# Patient Record
Sex: FEMALE | Race: WHITE | NOT HISPANIC OR LATINO | Employment: OTHER | URBAN - METROPOLITAN AREA
[De-identification: names, ages, dates, MRNs, and addresses within clinical notes are randomized per-mention and may not be internally consistent; named-entity substitution may affect disease eponyms.]

---

## 2020-03-19 LAB
EXTERNAL ABO GROUPING: NORMAL
EXTERNAL ANTIBODY SCREEN: NEGATIVE
EXTERNAL CHLAMYDIA SCREEN: NEGATIVE
EXTERNAL GONORRHEA SCREEN: NEGATIVE
EXTERNAL HEPATITIS B SURFACE ANTIGEN: NEGATIVE
EXTERNAL HEPATITIS C AB: NEGATIVE
EXTERNAL RH FACTOR: POSITIVE
EXTERNAL RUBELLA QUALITATIVE: NORMAL
EXTERNAL SYPHILIS RPR SCREEN: NORMAL
EXTERNAL URINE DRUG SCREEN: NEGATIVE
HIV1 P24 AG SERPL QL IA: NORMAL

## 2020-08-13 LAB — EXTERNAL GTT 1 HOUR: NORMAL

## 2020-08-21 LAB
EXTERNAL GLUCOSE TOLERANCE TEST FASTING: 78 MG/DL
EXTERNAL GTT 3 HOURS: NORMAL

## 2020-08-24 ENCOUNTER — TRANSCRIBE ORDERS (OUTPATIENT)
Dept: DIABETES SERVICES | Facility: HOSPITAL | Age: 25
End: 2020-08-24

## 2020-08-24 DIAGNOSIS — O24.410 DIET CONTROLLED GESTATIONAL DIABETES MELLITUS (GDM), ANTEPARTUM: Primary | ICD-10-CM

## 2020-08-28 ENCOUNTER — HOSPITAL ENCOUNTER (OUTPATIENT)
Dept: DIABETES SERVICES | Facility: HOSPITAL | Age: 25
Setting detail: RECURRING SERIES
Discharge: HOME | End: 2020-08-28

## 2020-08-28 NOTE — CONSULTS
This medical referred consult was provided as a telephone call, tele-health or e-visit, as patient is unable to attend an in-office appointment due to the COVID-19 crisis. Consent for treatment was given verbally.  Patient attended their scheduled gestational diabetes education visit.  Please see media tab for assessment and notes if you use [facility]. If you are not an [employer] user a copy of patient's assessment and notes will be sent per routine. Thank you.

## 2020-09-03 ENCOUNTER — ROUTINE PRENATAL (OUTPATIENT)
Dept: OBSTETRICS AND GYNECOLOGY | Facility: CLINIC | Age: 25
End: 2020-09-03

## 2020-09-03 VITALS — DIASTOLIC BLOOD PRESSURE: 70 MMHG | SYSTOLIC BLOOD PRESSURE: 112 MMHG | WEIGHT: 146 LBS

## 2020-09-03 DIAGNOSIS — Z3A.31 31 WEEKS GESTATION OF PREGNANCY: Primary | ICD-10-CM

## 2020-09-03 LAB
GLUCOSE UR STRIP-MCNC: NEGATIVE MG/DL
PROT UR STRIP-MCNC: NEGATIVE MG/DL

## 2020-09-03 PROCEDURE — 99212 OFFICE O/P EST SF 10 MIN: CPT | Performed by: NURSE PRACTITIONER

## 2020-09-03 RX ORDER — PRENATAL VIT/IRON FUM/FOLIC AC 27MG-0.8MG
1 TABLET ORAL DAILY
COMMUNITY
End: 2020-09-03

## 2020-09-03 NOTE — PROGRESS NOTES
OB FOLLOW UP    Arabella Lau is a 24 y.o.  31w4d patient being seen today for her obstetrical follow up visit.      Her prenatal care is complicated by (and status) :    There is no problem list on file for this patient.      ROS -   Patient Reports : no problems  Patient Denies:  Bleeding, headaches, gush fld, visual changes.  LMP 2020                           Assessment    Pregnancy at 31w4d  GDMA1    Plan    1. Will start fingersticks today and bring in results at appts  2. Growth US 2 wks  3. Rev daily [facility], JOSIAH prec      Tiffany Hope, APRN  2020

## 2020-09-09 ENCOUNTER — HOSPITAL ENCOUNTER (OUTPATIENT)
Dept: DIABETES SERVICES | Facility: HOSPITAL | Age: 25
Setting detail: RECURRING SERIES
Discharge: HOME | End: 2020-09-09

## 2020-09-17 ENCOUNTER — ROUTINE PRENATAL (OUTPATIENT)
Dept: OBSTETRICS AND GYNECOLOGY | Facility: CLINIC | Age: 25
End: 2020-09-17

## 2020-09-17 VITALS — DIASTOLIC BLOOD PRESSURE: 54 MMHG | SYSTOLIC BLOOD PRESSURE: 120 MMHG | WEIGHT: 150.4 LBS

## 2020-09-17 DIAGNOSIS — Z34.90 PREGNANCY, UNSPECIFIED GESTATIONAL AGE: Primary | ICD-10-CM

## 2020-09-17 PROBLEM — O24.419 GESTATIONAL DIABETES: Status: ACTIVE | Noted: 2020-08-24

## 2020-09-17 LAB
EXPIRATION DATE: 0
GLUCOSE UR STRIP-MCNC: NEGATIVE MG/DL
Lab: 0
PROT UR STRIP-MCNC: NEGATIVE MG/DL

## 2020-09-17 PROCEDURE — 99213 OFFICE O/P EST LOW 20 MIN: CPT | Performed by: OBSTETRICS & GYNECOLOGY

## 2020-09-17 RX ORDER — DIPHENHYDRAMINE HYDROCHLORIDE 25 MG/1
25 CAPSULE ORAL DAILY
COMMUNITY

## 2020-09-17 RX ORDER — PRENATAL VIT NO.126/IRON/FOLIC 28MG-0.8MG
1 TABLET ORAL DAILY
COMMUNITY

## 2020-09-17 NOTE — PROGRESS NOTES
OB FOLLOW UP  CC- Here for care of pregnancy        Arabella Lau is a 24 y.o.  33w4d patient being seen today for her obstetrical follow up visit/ GDM. Patient reports no complaints. Home blood sugars running 80s (fasting) and 90s-110s (postprandials).    Her prenatal care is complicated by (and status) :    Patient Active Problem List   Diagnosis   • Gestational diabetes       The additional following portions of the patient's history were reviewed and updated as appropriate: allergies, current medications, past family history, past medical history, past social history, past surgical history and problem list.    ROS -   Patient Reports : No Problems  Patient Denies: Loss of Fluid, Vaginal Spotting, Vision Changes, Headaches and Cramping/Contractions   Fetal Movement : normal, > 10    I have reviewed and agree with the HPI, ROS, and historical information as entered above. Lina Martinez MD    /54   Wt 68.2 kg (150 lb 6.4 oz)   LMP 2020     Ultrasound Today: yes, EFW 53rd%    EXAM:   Vitals: See prenatal flowsheet   Abdomen: See prenatal flowsheet   Urine glucose/protein: See prenatal flowsheet   Pelvic: See prenatal flowsheet   HRT: See prenatal flowsheet   Presentation: See prenatal flowsheet   Movement: See prenatal flowsheet          Assessment and Plan    Problem List Items Addressed This Visit     None      Visit Diagnoses     Pregnancy, unspecified gestational age    -  Primary    Relevant Orders    POC Glucose, Urine, Qualitative, Dipstick (Completed)    POC Protein, Urine, Qualitative, Dipstick (Completed)          1. Pregnancy at 33w4d  2. Fetal status reassuring.   3. Activity and Exercise discussed.  Return in about 2 weeks (around 10/1/2020) for F/U Prenatal.  4. US today for growth: 5#2oz. vtx,    Lina Martinez MD  2020

## 2020-10-01 ENCOUNTER — ROUTINE PRENATAL (OUTPATIENT)
Dept: OBSTETRICS AND GYNECOLOGY | Facility: CLINIC | Age: 25
End: 2020-10-01

## 2020-10-01 VITALS
SYSTOLIC BLOOD PRESSURE: 132 MMHG | WEIGHT: 154.5 LBS | DIASTOLIC BLOOD PRESSURE: 54 MMHG | BODY MASS INDEX: 26.38 KG/M2 | HEIGHT: 64 IN

## 2020-10-01 DIAGNOSIS — Z34.90 PREGNANCY, UNSPECIFIED GESTATIONAL AGE: Primary | ICD-10-CM

## 2020-10-01 LAB
EXPIRATION DATE: 0
GLUCOSE UR STRIP-MCNC: ABNORMAL MG/DL
Lab: 0
PROT UR STRIP-MCNC: NEGATIVE MG/DL

## 2020-10-01 PROCEDURE — 90472 IMMUNIZATION ADMIN EACH ADD: CPT | Performed by: NURSE PRACTITIONER

## 2020-10-01 PROCEDURE — 90715 TDAP VACCINE 7 YRS/> IM: CPT | Performed by: NURSE PRACTITIONER

## 2020-10-01 PROCEDURE — 99213 OFFICE O/P EST LOW 20 MIN: CPT | Performed by: NURSE PRACTITIONER

## 2020-10-01 PROCEDURE — 90471 IMMUNIZATION ADMIN: CPT | Performed by: NURSE PRACTITIONER

## 2020-10-01 PROCEDURE — 90686 IIV4 VACC NO PRSV 0.5 ML IM: CPT | Performed by: NURSE PRACTITIONER

## 2020-10-01 NOTE — PROGRESS NOTES
"OB FOLLOW UP  CC- Here for care of pregnancy         Arabella Lau is a 25 y.o.  35w4d patient being seen today for her obstetrical follow up visit. Patient reports rare Bethel Souza and tingling in hands that began this week. Blood glucoses consistent, per patient; 70s-80s fasting and post-prandials 90s-110s. Rare elevated sugars (120s) due to dietary choices. This AM, patient had cheerios with granola, banana bread, and scrambled eggs.    Her prenatal care is complicated by (and status) :    Patient Active Problem List   Diagnosis   • Gestational diabetes       Flu Status: Will give in office today    The additional following portions of the patient's history were reviewed and updated as appropriate: allergies, current medications, past family history, past medical history, past social history, past surgical history and problem list.    ROS -   Patient Reports : rare Bethel Souza  Patient Denies: Loss of Fluid, Vaginal Spotting, Vision Changes and Headaches  Fetal Movement : normal, > 10  All other systems reviewed and are negative.     I have reviewed and agree with the HPI, ROS, and historical information as entered above. Kylah Mills, APRN    /54   Ht 161.3 cm (63.5\")   Wt 70.1 kg (154 lb 8 oz)   LMP 2020   BMI 26.94 kg/m²     Ultrasound Today: no    EXAM:   Vitals: See prenatal flowsheet   Abdomen: See prenatal flowsheet   Urine glucose/protein: See prenatal flowsheet   Pelvic: See prenatal flowsheet   HRT: See prenatal flowsheet   Presentation: See prenatal flowsheet   Movement: See prenatal flowsheet          Assessment and Plan    Problem List Items Addressed This Visit     None      Visit Diagnoses     Pregnancy, unspecified gestational age    -  Primary    Relevant Orders    POC Glucose, Urine, Qualitative, Dipstick (Completed)    POC Protein, Urine, Qualitative, Dipstick (Completed)    Tdap Vaccine Greater Than or Equal To 6yo IM (Completed)    Fluarix/Fluzone/Afluria Quad>6 " Months (Completed)          1. Pregnancy at 35w4d  2. Fetal status reassuring.   3. Activity and Exercise discussed.  Return in about 1 week (around 10/8/2020).    Kylah Mills, APRN  10/01/2020

## 2020-10-09 ENCOUNTER — LAB (OUTPATIENT)
Dept: LAB | Facility: HOSPITAL | Age: 25
End: 2020-10-09

## 2020-10-09 ENCOUNTER — ROUTINE PRENATAL (OUTPATIENT)
Dept: OBSTETRICS AND GYNECOLOGY | Facility: CLINIC | Age: 25
End: 2020-10-09

## 2020-10-09 VITALS — BODY MASS INDEX: 27.03 KG/M2 | SYSTOLIC BLOOD PRESSURE: 130 MMHG | DIASTOLIC BLOOD PRESSURE: 68 MMHG | WEIGHT: 155 LBS

## 2020-10-09 DIAGNOSIS — Z86.32 HISTORY OF GESTATIONAL DIABETES: Primary | ICD-10-CM

## 2020-10-09 DIAGNOSIS — Z3A.36 36 WEEKS GESTATION OF PREGNANCY: ICD-10-CM

## 2020-10-09 LAB
GLUCOSE UR STRIP-MCNC: ABNORMAL MG/DL
PROT UR STRIP-MCNC: NEGATIVE MG/DL

## 2020-10-09 PROCEDURE — 99213 OFFICE O/P EST LOW 20 MIN: CPT | Performed by: OBSTETRICS & GYNECOLOGY

## 2020-10-09 PROCEDURE — 87081 CULTURE SCREEN ONLY: CPT | Performed by: OBSTETRICS & GYNECOLOGY

## 2020-10-09 NOTE — PROGRESS NOTES
"OB FOLLOW UP  CC- Here for care of pregnancy        Arabella Lau is a 25 y.o.  36w5d patient being seen today for her obstetrical follow up visit. Patient reports no complaints and RL pain and \"tension h/a\".     Her prenatal care is complicated by GDM Pt reports fasting BS's <90 and pp <120s      Patient Active Problem List   Diagnosis   • Gestational diabetes       Flu Status: Already given in current flu season    The additional following portions of the patient's history were reviewed and updated as appropriate: allergies, current medications, past family history, past medical history, past social history, past surgical history and problem list.    ROS -   Patient Reports : No Problems  Patient Denies: Loss of Fluid, Vaginal Spotting, Vision Changes and Cramping/Contractions   Fetal Movement : normal  All other systems reviewed and are negative.     I have reviewed and agree with the HPI, ROS, and historical information as entered above. Lina Martinez MD    /68   Wt 70.3 kg (155 lb)   LMP 2020   BMI 27.03 kg/m²     Ultrasound Today: no    EXAM:   Vitals: See prenatal flowsheet   Abdomen: See prenatal flowsheet   Urine glucose/protein: See prenatal flowsheet   Pelvic: See prenatal flowsheet   HRT: See prenatal flowsheet   Presentation: See prenatal flowsheet   Movement: See prenatal flowsheet       GBS Status: Done Today  Her Delivery Plan is: Undecided       Assessment and Plan    Problem List Items Addressed This Visit     None      Visit Diagnoses     History of gestational diabetes    -  Primary    Relevant Orders    POC Urinalysis Dipstick (Completed)    Group B Streptococcus Culture - , Vagina    36 weeks gestation of pregnancy        Relevant Orders    Group B Streptococcus Culture - , Vagina    Fetal Nonstress Test          1. Pregnancy at 36w5d  2. Fetal status reassuring.   3. Activity and Exercise discussed.  Return in about 1 week (around 10/16/2020) for F/U Prenatal, NST Next " Visit.  5.  GDM, good control,. Will start weekly nsts.   6. Attempted CE today, but cherelle has difficulty with pelvic exams. Discussed ACOG recs for induction with A1DM of 39-40 6/7 wks. Uncertain that induction will be possible for cherelle if we cannot check her cervix, or place FB/ cytotec/ cervidil. Hope for spontaneous labor, and discussed option of 1 c/s if she desires.    Lina Martinez MD  10/09/2020

## 2020-10-11 LAB
BACTERIA SPEC AEROBE CULT: ABNORMAL
STREP GROUPING: ABNORMAL

## 2020-10-16 ENCOUNTER — ROUTINE PRENATAL (OUTPATIENT)
Dept: OBSTETRICS AND GYNECOLOGY | Facility: CLINIC | Age: 25
End: 2020-10-16

## 2020-10-16 VITALS — DIASTOLIC BLOOD PRESSURE: 70 MMHG | SYSTOLIC BLOOD PRESSURE: 110 MMHG | BODY MASS INDEX: 27.55 KG/M2 | WEIGHT: 158 LBS

## 2020-10-16 DIAGNOSIS — Z3A.37 37 WEEKS GESTATION OF PREGNANCY: Primary | ICD-10-CM

## 2020-10-16 DIAGNOSIS — Z3A.36 36 WEEKS GESTATION OF PREGNANCY: ICD-10-CM

## 2020-10-16 LAB
EXPIRATION DATE: 0
GLUCOSE UR STRIP-MCNC: NEGATIVE MG/DL
Lab: 0
PROT UR STRIP-MCNC: NEGATIVE MG/DL

## 2020-10-16 PROCEDURE — 99213 OFFICE O/P EST LOW 20 MIN: CPT | Performed by: NURSE PRACTITIONER

## 2020-10-16 NOTE — PROGRESS NOTES
OB FOLLOW UP  CC- Here for care of pregnancy        Arabella Lau is a 25 y.o.  37w5d patient being seen today for her obstetrical follow up visit. Patient reports edema, nausea, diarrhea, heartburn, round ligament pain.  She also reports her at home glucose numbers have been running between upper 70's/lower 80's for fasting and upper 90's-115.    Her prenatal care is complicated by (and status) :    Patient Active Problem List   Diagnosis   • Gestational diabetes       Flu Status: Already given in current flu season    The additional following portions of the patient's history were reviewed and updated as appropriate: allergies, current medications, past family history, past medical history, past social history, past surgical history and problem list.    ROS -   Patient Reports : edema in hands and feet, nausea, diarrhea, heartburn, round ligament pain  Patient Denies: vaginal bleeding, loss of fluids, vomiting, cramping, maxime rios, contractions, headaches, vision changes, dysuria, constipation,   Fetal Movement : Patient reports feeling baby move at least 10 times in 8-10 hours    All other systems reviewed and are negative.     I have reviewed and agree with the HPI, ROS, and historical information as entered above. Ted KAELA Crouch    /70   Wt 71.7 kg (158 lb)   LMP 2020   BMI 27.55 kg/m²     Ultrasound Today: no    EXAM:   Vitals: See prenatal flowsheet   Abdomen: See prenatal flowsheet   Urine glucose/protein: See prenatal flowsheet       HRT: See prenatal flowsheet   Presentation: See prenatal flowsheet   Movement: See prenatal flowsheet       GBS Status: Done Today and Was done on 10/9/2020.  The results are negative.   Her Delivery Plan is: would prefer natural labor but will consider induction after 40 weeks or if necessary.       Assessment and Plan    Problem List Items Addressed This Visit     None      Visit Diagnoses     37 weeks gestation of pregnancy    -  Primary    Relevant  Orders    POC Glucose, Urine, Qualitative, Dipstick (Completed)    POC Protein, Urine, Qualitative, Dipstick (Completed)          1. Pregnancy at 37w5d  2. Fetal status reassuring.   3. Activity and Exercise discussed.  4.  RTO one week with growth US.    5.  Rev daily [facility], labor prec.  Push flds.  Glucose control.   Ted KAELA Crouch  10/16/2020

## 2020-10-22 ENCOUNTER — ROUTINE PRENATAL (OUTPATIENT)
Dept: OBSTETRICS AND GYNECOLOGY | Facility: CLINIC | Age: 25
End: 2020-10-22

## 2020-10-22 VITALS — BODY MASS INDEX: 27.9 KG/M2 | WEIGHT: 160 LBS | DIASTOLIC BLOOD PRESSURE: 76 MMHG | SYSTOLIC BLOOD PRESSURE: 128 MMHG

## 2020-10-22 DIAGNOSIS — O24.419 GESTATIONAL DIABETES MELLITUS (GDM) IN THIRD TRIMESTER, GESTATIONAL DIABETES METHOD OF CONTROL UNSPECIFIED: Primary | ICD-10-CM

## 2020-10-22 DIAGNOSIS — Z3A.38 38 WEEKS GESTATION OF PREGNANCY: ICD-10-CM

## 2020-10-22 LAB
GLUCOSE UR STRIP-MCNC: NEGATIVE MG/DL
PROT UR STRIP-MCNC: NEGATIVE MG/DL

## 2020-10-22 PROCEDURE — 99213 OFFICE O/P EST LOW 20 MIN: CPT | Performed by: NURSE PRACTITIONER

## 2020-10-23 ENCOUNTER — HOSPITAL ENCOUNTER (INPATIENT)
Facility: HOSPITAL | Age: 25
LOS: 2 days | Discharge: HOME | End: 2020-10-25
Attending: OBSTETRICS & GYNECOLOGY | Admitting: OBSTETRICS & GYNECOLOGY

## 2020-10-23 ENCOUNTER — ANESTHESIA EVENT (OUTPATIENT)
Dept: LABOR AND DELIVERY | Facility: HOSPITAL | Age: 25
End: 2020-10-23

## 2020-10-23 ENCOUNTER — ANESTHESIA (OUTPATIENT)
Dept: LABOR AND DELIVERY | Facility: HOSPITAL | Age: 25
End: 2020-10-23

## 2020-10-23 PROBLEM — Z37.9 NORMAL LABOR: Status: ACTIVE | Noted: 2020-10-23

## 2020-10-23 LAB
ABO GROUP BLD: NORMAL
ALP SERPL-CCNC: 150 U/L (ref 39–117)
ALT SERPL W P-5'-P-CCNC: 15 U/L (ref 1–33)
AST SERPL-CCNC: 33 U/L (ref 1–32)
BILIRUB SERPL-MCNC: 0.2 MG/DL (ref 0–1.2)
BLD GP AB SCN SERPL QL: NEGATIVE
CREAT SERPL-MCNC: 0.55 MG/DL (ref 0.57–1)
DEPRECATED RDW RBC AUTO: 53.7 FL (ref 37–54)
ERYTHROCYTE [DISTWIDTH] IN BLOOD BY AUTOMATED COUNT: 14.7 % (ref 12.3–15.4)
GLUCOSE BLDC GLUCOMTR-MCNC: 80 MG/DL (ref 70–130)
GLUCOSE BLDC GLUCOMTR-MCNC: 89 MG/DL (ref 70–130)
GLUCOSE BLDC GLUCOMTR-MCNC: 91 MG/DL (ref 70–130)
GLUCOSE BLDC GLUCOMTR-MCNC: 94 MG/DL (ref 70–130)
HCT VFR BLD AUTO: 38.1 % (ref 34–46.6)
HGB BLD-MCNC: 12.9 G/DL (ref 12–15.9)
LDH SERPL-CCNC: 320 U/L (ref 135–214)
MCH RBC QN AUTO: 33.7 PG (ref 26.6–33)
MCHC RBC AUTO-ENTMCNC: 33.9 G/DL (ref 31.5–35.7)
MCV RBC AUTO: 99.5 FL (ref 79–97)
PLATELET # BLD AUTO: 165 10*3/MM3 (ref 140–450)
PMV BLD AUTO: 10 FL (ref 6–12)
RBC # BLD AUTO: 3.83 10*6/MM3 (ref 3.77–5.28)
RH BLD: POSITIVE
SARS-COV-2 RNA RESP QL NAA+PROBE: NOT DETECTED
T&S EXPIRATION DATE: NORMAL
URATE SERPL-MCNC: 4.6 MG/DL (ref 2.4–5.7)
WBC # BLD AUTO: 11.47 10*3/MM3 (ref 3.4–10.8)

## 2020-10-23 PROCEDURE — 25010000002 FENTANYL CITRATE (PF) 100 MCG/2ML SOLUTION: Performed by: ANESTHESIOLOGY

## 2020-10-23 PROCEDURE — 83615 LACTATE (LD) (LDH) ENZYME: CPT | Performed by: OBSTETRICS & GYNECOLOGY

## 2020-10-23 PROCEDURE — 84450 TRANSFERASE (AST) (SGOT): CPT | Performed by: OBSTETRICS & GYNECOLOGY

## 2020-10-23 PROCEDURE — 86901 BLOOD TYPING SEROLOGIC RH(D): CPT | Performed by: OBSTETRICS & GYNECOLOGY

## 2020-10-23 PROCEDURE — 82565 ASSAY OF CREATININE: CPT | Performed by: OBSTETRICS & GYNECOLOGY

## 2020-10-23 PROCEDURE — 86900 BLOOD TYPING SEROLOGIC ABO: CPT

## 2020-10-23 PROCEDURE — 25010000002 PENICILLIN G POTASSIUM PER 600000 UNITS: Performed by: OBSTETRICS & GYNECOLOGY

## 2020-10-23 PROCEDURE — 25010000002 ROPIVACAINE PER 1 MG: Performed by: ANESTHESIOLOGY

## 2020-10-23 PROCEDURE — 59025 FETAL NON-STRESS TEST: CPT

## 2020-10-23 PROCEDURE — C1755 CATHETER, INTRASPINAL: HCPCS | Performed by: ANESTHESIOLOGY

## 2020-10-23 PROCEDURE — 59410 OBSTETRICAL CARE: CPT | Performed by: OBSTETRICS & GYNECOLOGY

## 2020-10-23 PROCEDURE — 86900 BLOOD TYPING SEROLOGIC ABO: CPT | Performed by: OBSTETRICS & GYNECOLOGY

## 2020-10-23 PROCEDURE — 86901 BLOOD TYPING SEROLOGIC RH(D): CPT

## 2020-10-23 PROCEDURE — 25010000002 ROPIVACAINE PER 1 MG: Performed by: NURSE ANESTHETIST, CERTIFIED REGISTERED

## 2020-10-23 PROCEDURE — 51703 INSERT BLADDER CATH COMPLEX: CPT

## 2020-10-23 PROCEDURE — 82247 BILIRUBIN TOTAL: CPT | Performed by: OBSTETRICS & GYNECOLOGY

## 2020-10-23 PROCEDURE — U0004 COV-19 TEST NON-CDC HGH THRU: HCPCS | Performed by: OBSTETRICS & GYNECOLOGY

## 2020-10-23 PROCEDURE — 86850 RBC ANTIBODY SCREEN: CPT | Performed by: OBSTETRICS & GYNECOLOGY

## 2020-10-23 PROCEDURE — 85027 COMPLETE CBC AUTOMATED: CPT | Performed by: OBSTETRICS & GYNECOLOGY

## 2020-10-23 PROCEDURE — 82962 GLUCOSE BLOOD TEST: CPT

## 2020-10-23 PROCEDURE — 84075 ASSAY ALKALINE PHOSPHATASE: CPT | Performed by: OBSTETRICS & GYNECOLOGY

## 2020-10-23 PROCEDURE — 0KQM0ZZ REPAIR PERINEUM MUSCLE, OPEN APPROACH: ICD-10-PCS | Performed by: OBSTETRICS & GYNECOLOGY

## 2020-10-23 PROCEDURE — 84460 ALANINE AMINO (ALT) (SGPT): CPT | Performed by: OBSTETRICS & GYNECOLOGY

## 2020-10-23 PROCEDURE — C1755 CATHETER, INTRASPINAL: HCPCS

## 2020-10-23 PROCEDURE — 25010000002 ONDANSETRON PER 1 MG: Performed by: ANESTHESIOLOGY

## 2020-10-23 PROCEDURE — 84550 ASSAY OF BLOOD/URIC ACID: CPT | Performed by: OBSTETRICS & GYNECOLOGY

## 2020-10-23 RX ORDER — LANOLIN
CREAM (ML) TOPICAL
Status: DISCONTINUED | OUTPATIENT
Start: 2020-10-23 | End: 2020-10-25 | Stop reason: HOSPADM

## 2020-10-23 RX ORDER — LIDOCAINE HYDROCHLORIDE AND EPINEPHRINE 15; 5 MG/ML; UG/ML
INJECTION, SOLUTION EPIDURAL AS NEEDED
Status: DISCONTINUED | OUTPATIENT
Start: 2020-10-23 | End: 2020-10-23 | Stop reason: SURG

## 2020-10-23 RX ORDER — BISACODYL 10 MG
10 SUPPOSITORY, RECTAL RECTAL DAILY PRN
Status: DISCONTINUED | OUTPATIENT
Start: 2020-10-24 | End: 2020-10-25 | Stop reason: HOSPADM

## 2020-10-23 RX ORDER — DIPHENHYDRAMINE HYDROCHLORIDE 50 MG/ML
12.5 INJECTION INTRAMUSCULAR; INTRAVENOUS EVERY 8 HOURS PRN
Status: DISCONTINUED | OUTPATIENT
Start: 2020-10-23 | End: 2020-10-23 | Stop reason: HOSPADM

## 2020-10-23 RX ORDER — OXYCODONE HYDROCHLORIDE AND ACETAMINOPHEN 5; 325 MG/1; MG/1
1 TABLET ORAL EVERY 4 HOURS PRN
Status: DISCONTINUED | OUTPATIENT
Start: 2020-10-23 | End: 2020-10-25 | Stop reason: HOSPADM

## 2020-10-23 RX ORDER — ONDANSETRON 2 MG/ML
4 INJECTION INTRAMUSCULAR; INTRAVENOUS ONCE AS NEEDED
Status: COMPLETED | OUTPATIENT
Start: 2020-10-23 | End: 2020-10-23

## 2020-10-23 RX ORDER — BUTORPHANOL TARTRATE 1 MG/ML
1 INJECTION, SOLUTION INTRAMUSCULAR; INTRAVENOUS
Status: DISCONTINUED | OUTPATIENT
Start: 2020-10-23 | End: 2020-10-23 | Stop reason: HOSPADM

## 2020-10-23 RX ORDER — BUTORPHANOL TARTRATE 1 MG/ML
2 INJECTION, SOLUTION INTRAMUSCULAR; INTRAVENOUS
Status: DISCONTINUED | OUTPATIENT
Start: 2020-10-23 | End: 2020-10-23 | Stop reason: HOSPADM

## 2020-10-23 RX ORDER — ONDANSETRON 2 MG/ML
4 INJECTION INTRAMUSCULAR; INTRAVENOUS EVERY 6 HOURS PRN
Status: DISCONTINUED | OUTPATIENT
Start: 2020-10-23 | End: 2020-10-24

## 2020-10-23 RX ORDER — SODIUM CHLORIDE, SODIUM LACTATE, POTASSIUM CHLORIDE, CALCIUM CHLORIDE 600; 310; 30; 20 MG/100ML; MG/100ML; MG/100ML; MG/100ML
125 INJECTION, SOLUTION INTRAVENOUS CONTINUOUS
Status: DISCONTINUED | OUTPATIENT
Start: 2020-10-23 | End: 2020-10-23

## 2020-10-23 RX ORDER — SODIUM CHLORIDE 0.9 % (FLUSH) 0.9 %
10 SYRINGE (ML) INJECTION AS NEEDED
Status: DISCONTINUED | OUTPATIENT
Start: 2020-10-23 | End: 2020-10-23 | Stop reason: HOSPADM

## 2020-10-23 RX ORDER — MAGNESIUM CARB/ALUMINUM HYDROX 105-160MG
30 TABLET,CHEWABLE ORAL ONCE
Status: COMPLETED | OUTPATIENT
Start: 2020-10-23 | End: 2020-10-23

## 2020-10-23 RX ORDER — DOCUSATE SODIUM 100 MG/1
100 CAPSULE, LIQUID FILLED ORAL 2 TIMES DAILY
Status: DISCONTINUED | OUTPATIENT
Start: 2020-10-23 | End: 2020-10-25 | Stop reason: HOSPADM

## 2020-10-23 RX ORDER — ONDANSETRON 2 MG/ML
4 INJECTION INTRAMUSCULAR; INTRAVENOUS EVERY 6 HOURS PRN
Status: DISCONTINUED | OUTPATIENT
Start: 2020-10-23 | End: 2020-10-23 | Stop reason: HOSPADM

## 2020-10-23 RX ORDER — OXYTOCIN-SODIUM CHLORIDE 0.9% IV SOLN 30 UNIT/500ML 30-0.9/5 UT/ML-%
85 SOLUTION INTRAVENOUS ONCE
Status: COMPLETED | OUTPATIENT
Start: 2020-10-23 | End: 2020-10-23

## 2020-10-23 RX ORDER — EPHEDRINE SULFATE 5 MG/ML
10 INJECTION INTRAVENOUS
Status: DISCONTINUED | OUTPATIENT
Start: 2020-10-23 | End: 2020-10-23 | Stop reason: HOSPADM

## 2020-10-23 RX ORDER — PENICILLIN G 3000000 [IU]/50ML
3 INJECTION, SOLUTION INTRAVENOUS EVERY 4 HOURS
Status: DISCONTINUED | OUTPATIENT
Start: 2020-10-23 | End: 2020-10-23 | Stop reason: HOSPADM

## 2020-10-23 RX ORDER — HYDROCODONE BITARTRATE AND ACETAMINOPHEN 5; 325 MG/1; MG/1
1 TABLET ORAL EVERY 4 HOURS PRN
Status: DISCONTINUED | OUTPATIENT
Start: 2020-10-23 | End: 2020-10-25 | Stop reason: HOSPADM

## 2020-10-23 RX ORDER — LIDOCAINE HYDROCHLORIDE 10 MG/ML
5 INJECTION, SOLUTION EPIDURAL; INFILTRATION; INTRACAUDAL; PERINEURAL AS NEEDED
Status: DISCONTINUED | OUTPATIENT
Start: 2020-10-23 | End: 2020-10-23 | Stop reason: HOSPADM

## 2020-10-23 RX ORDER — SODIUM CHLORIDE 0.9 % (FLUSH) 0.9 %
3 SYRINGE (ML) INJECTION EVERY 12 HOURS SCHEDULED
Status: DISCONTINUED | OUTPATIENT
Start: 2020-10-23 | End: 2020-10-23 | Stop reason: HOSPADM

## 2020-10-23 RX ORDER — TRISODIUM CITRATE DIHYDRATE AND CITRIC ACID MONOHYDRATE 500; 334 MG/5ML; MG/5ML
30 SOLUTION ORAL ONCE
Status: DISCONTINUED | OUTPATIENT
Start: 2020-10-23 | End: 2020-10-23 | Stop reason: HOSPADM

## 2020-10-23 RX ORDER — METHYLERGONOVINE MALEATE 0.2 MG/ML
200 INJECTION INTRAVENOUS ONCE AS NEEDED
Status: DISCONTINUED | OUTPATIENT
Start: 2020-10-23 | End: 2020-10-23 | Stop reason: HOSPADM

## 2020-10-23 RX ORDER — IBUPROFEN 600 MG/1
600 TABLET ORAL EVERY 6 HOURS PRN
Status: DISCONTINUED | OUTPATIENT
Start: 2020-10-23 | End: 2020-10-25 | Stop reason: HOSPADM

## 2020-10-23 RX ORDER — METOCLOPRAMIDE HYDROCHLORIDE 5 MG/ML
10 INJECTION INTRAMUSCULAR; INTRAVENOUS ONCE AS NEEDED
Status: DISCONTINUED | OUTPATIENT
Start: 2020-10-23 | End: 2020-10-23 | Stop reason: HOSPADM

## 2020-10-23 RX ORDER — OXYTOCIN-SODIUM CHLORIDE 0.9% IV SOLN 30 UNIT/500ML 30-0.9/5 UT/ML-%
650 SOLUTION INTRAVENOUS ONCE
Status: COMPLETED | OUTPATIENT
Start: 2020-10-23 | End: 2020-10-23

## 2020-10-23 RX ORDER — CARBOPROST TROMETHAMINE 250 UG/ML
250 INJECTION, SOLUTION INTRAMUSCULAR AS NEEDED
Status: DISCONTINUED | OUTPATIENT
Start: 2020-10-23 | End: 2020-10-23 | Stop reason: HOSPADM

## 2020-10-23 RX ORDER — PROMETHAZINE HYDROCHLORIDE 12.5 MG/1
12.5 TABLET ORAL EVERY 4 HOURS PRN
Status: DISCONTINUED | OUTPATIENT
Start: 2020-10-23 | End: 2020-10-25 | Stop reason: HOSPADM

## 2020-10-23 RX ORDER — ONDANSETRON 4 MG/1
4 TABLET, FILM COATED ORAL EVERY 6 HOURS PRN
Status: DISCONTINUED | OUTPATIENT
Start: 2020-10-23 | End: 2020-10-25 | Stop reason: HOSPADM

## 2020-10-23 RX ORDER — MISOPROSTOL 200 UG/1
800 TABLET ORAL AS NEEDED
Status: DISCONTINUED | OUTPATIENT
Start: 2020-10-23 | End: 2020-10-23 | Stop reason: HOSPADM

## 2020-10-23 RX ORDER — ROPIVACAINE HYDROCHLORIDE 5 MG/ML
INJECTION, SOLUTION EPIDURAL; INFILTRATION; PERINEURAL AS NEEDED
Status: DISCONTINUED | OUTPATIENT
Start: 2020-10-23 | End: 2020-10-23 | Stop reason: SURG

## 2020-10-23 RX ORDER — ONDANSETRON 4 MG/1
4 TABLET, FILM COATED ORAL EVERY 6 HOURS PRN
Status: DISCONTINUED | OUTPATIENT
Start: 2020-10-23 | End: 2020-10-23 | Stop reason: HOSPADM

## 2020-10-23 RX ORDER — HYDROCORTISONE 25 MG/G
1 CREAM TOPICAL AS NEEDED
Status: DISCONTINUED | OUTPATIENT
Start: 2020-10-23 | End: 2020-10-25 | Stop reason: HOSPADM

## 2020-10-23 RX ORDER — ACETAMINOPHEN 325 MG/1
650 TABLET ORAL EVERY 4 HOURS PRN
Status: DISCONTINUED | OUTPATIENT
Start: 2020-10-23 | End: 2020-10-23 | Stop reason: HOSPADM

## 2020-10-23 RX ORDER — ROPIVACAINE HYDROCHLORIDE 2 MG/ML
15 INJECTION, SOLUTION EPIDURAL; INFILTRATION; PERINEURAL CONTINUOUS
Status: DISCONTINUED | OUTPATIENT
Start: 2020-10-23 | End: 2020-10-23

## 2020-10-23 RX ORDER — FENTANYL CITRATE 50 UG/ML
INJECTION, SOLUTION INTRAMUSCULAR; INTRAVENOUS AS NEEDED
Status: DISCONTINUED | OUTPATIENT
Start: 2020-10-23 | End: 2020-10-23 | Stop reason: SURG

## 2020-10-23 RX ORDER — FAMOTIDINE 10 MG/ML
20 INJECTION, SOLUTION INTRAVENOUS ONCE AS NEEDED
Status: DISCONTINUED | OUTPATIENT
Start: 2020-10-23 | End: 2020-10-23 | Stop reason: HOSPADM

## 2020-10-23 RX ORDER — SODIUM CHLORIDE 0.9 % (FLUSH) 0.9 %
1-10 SYRINGE (ML) INJECTION AS NEEDED
Status: DISCONTINUED | OUTPATIENT
Start: 2020-10-23 | End: 2020-10-24

## 2020-10-23 RX ADMIN — OXYTOCIN 650 ML/HR: 10 INJECTION INTRAVENOUS at 17:45

## 2020-10-23 RX ADMIN — WITCH HAZEL 1 PAD: 500 SOLUTION RECTAL; TOPICAL at 21:03

## 2020-10-23 RX ADMIN — SODIUM CHLORIDE, POTASSIUM CHLORIDE, SODIUM LACTATE AND CALCIUM CHLORIDE 125 ML/HR: 600; 310; 30; 20 INJECTION, SOLUTION INTRAVENOUS at 06:35

## 2020-10-23 RX ADMIN — PENICILLIN G 3 MILLION UNITS: 3000000 INJECTION, SOLUTION INTRAVENOUS at 09:58

## 2020-10-23 RX ADMIN — IBUPROFEN 600 MG: 600 TABLET, FILM COATED ORAL at 18:29

## 2020-10-23 RX ADMIN — ONDANSETRON 4 MG: 2 INJECTION INTRAMUSCULAR; INTRAVENOUS at 14:23

## 2020-10-23 RX ADMIN — ROPIVACAINE HYDROCHLORIDE 12 ML: 5 INJECTION, SOLUTION EPIDURAL; INFILTRATION; PERINEURAL at 06:10

## 2020-10-23 RX ADMIN — SODIUM CHLORIDE, POTASSIUM CHLORIDE, SODIUM LACTATE AND CALCIUM CHLORIDE 125 ML/HR: 600; 310; 30; 20 INJECTION, SOLUTION INTRAVENOUS at 13:07

## 2020-10-23 RX ADMIN — ROPIVACAINE HYDROCHLORIDE 6 ML: 5 INJECTION, SOLUTION EPIDURAL; INFILTRATION; PERINEURAL at 12:04

## 2020-10-23 RX ADMIN — SODIUM CHLORIDE, POTASSIUM CHLORIDE, SODIUM LACTATE AND CALCIUM CHLORIDE 1000 ML: 600; 310; 30; 20 INJECTION, SOLUTION INTRAVENOUS at 05:46

## 2020-10-23 RX ADMIN — LIDOCAINE HYDROCHLORIDE AND EPINEPHRINE 3 ML: 15; 5 INJECTION, SOLUTION EPIDURAL at 06:06

## 2020-10-23 RX ADMIN — DOCUSATE SODIUM 100 MG: 100 CAPSULE ORAL at 21:03

## 2020-10-23 RX ADMIN — ROPIVACAINE HYDROCHLORIDE: 2 INJECTION, SOLUTION EPIDURAL; INFILTRATION at 12:03

## 2020-10-23 RX ADMIN — OXYTOCIN 85 ML/HR: 10 INJECTION INTRAVENOUS at 19:42

## 2020-10-23 RX ADMIN — LIDOCAINE HYDROCHLORIDE AND EPINEPHRINE 2 ML: 15; 5 INJECTION, SOLUTION EPIDURAL at 06:08

## 2020-10-23 RX ADMIN — Medication: at 21:03

## 2020-10-23 RX ADMIN — SODIUM CHLORIDE, POTASSIUM CHLORIDE, SODIUM LACTATE AND CALCIUM CHLORIDE 1000 ML: 600; 310; 30; 20 INJECTION, SOLUTION INTRAVENOUS at 05:20

## 2020-10-23 RX ADMIN — SODIUM CHLORIDE 5 MILLION UNITS: 900 INJECTION INTRAVENOUS at 05:46

## 2020-10-23 RX ADMIN — MINERAL OIL 30 ML: 99.9 LIQUID ORAL; TOPICAL at 17:00

## 2020-10-23 RX ADMIN — FENTANYL CITRATE 100 MCG: 50 INJECTION, SOLUTION INTRAMUSCULAR; INTRAVENOUS at 06:11

## 2020-10-23 RX ADMIN — PENICILLIN G 3 MILLION UNITS: 3000000 INJECTION, SOLUTION INTRAVENOUS at 13:52

## 2020-10-23 RX ADMIN — ROPIVACAINE HYDROCHLORIDE 15 ML/HR: 2 INJECTION, SOLUTION EPIDURAL; INFILTRATION at 06:16

## 2020-10-23 RX ADMIN — HYDROCODONE BITARTRATE AND ACETAMINOPHEN 1 TABLET: 5; 325 TABLET ORAL at 18:58

## 2020-10-23 NOTE — PLAN OF CARE
Goal Outcome Evaluation:  Plan of Care Reviewed With: patient, spouse  Progress: improving  Outcome Summary: SROM @ 0250 clear fluid, epidural in place & comfortable, last SVE 5/90/0, GDM-fingersticks q2h, GBS+ IV penG

## 2020-10-23 NOTE — L&D DELIVERY NOTE
10/23/2020    Patient:Arabella Lau    MR#:[id number]    Vaginal Delivery Note  25 y.o. yo female  at 38w5d    Patient Active Problem List   Diagnosis   • Gestational diabetes   • Normal labor       Delivery     Delivery: Vaginal, Spontaneous     YOB: 2020    Time of Birth: 5:41 PM      Anesthesia: Epidural     Delivering clinician:     Forceps?   No   Vacuum? No    Shoulder dystocia present: No          Infant    Findings: female  infant     Infant observations: Weight: 3660 g (8 lb 1.1 oz)     Observations/Comments:        Apgars: 8  @ 1 minute /    9  @ 5 minutes         Placenta, Cord, and Fluid    Placenta delivered  Spontaneous  at  10/23/2020  5:45 PM     Cord:   present.   Nuchal Cord?  yes; Number of nuchal loops present:  1    Cord blood obtained:     Cord gases obtained:      Cord gas results: Pending         Repair    Episiotomy: Yes    Lacerations: Yes  Laceration Information  Laceration Repaired?   Perineal: 2nd  Yes    Periurethral:       Labial:       Sulcus:       Vaginal:       Cervical:         Suture used for repair: 2-0 Vicryl   Estimated Blood Loss: 200  mls.         Complications  none    Disposition  Mother to Mother Baby/Postpartum  in stable condition currently.  Baby to remains with mom  in stable condition currently.    Description of Delivery  Patient pushed well  and delivered easily from oa presentation.  There was a nuchal x 1, reduced at perineum.  Shoulders delivered easily. Nose and mouth was bulb suctioned and baby was placed on mother's abdomen.  Cord delayed,  clamped and cut.         Lina Martinez MD  10/23/20  18:02 EDT           Pre-procedure checklist reviewed, AUC complete and pre-sedation note complete.    MD aware of maximum contrast dose of 318.2 mL.  The documentation recorded by the scribe accurately and completely reflects the service(s) I personally performed and the decisions made by me.

## 2020-10-23 NOTE — ANESTHESIA PREPROCEDURE EVALUATION
Anesthesia Evaluation     Patient summary reviewed and Nursing notes reviewed   NPO Solid Status: > 8 hours  NPO Liquid Status: > 8 hours           Airway   Mallampati: II  TM distance: >3 FB  Neck ROM: full  No difficulty expected  Dental      Pulmonary - negative pulmonary ROS   Cardiovascular - negative cardio ROS        Neuro/Psych- negative ROS  GI/Hepatic/Renal/Endo    (+)   diabetes mellitus gestational,     Musculoskeletal (-) negative ROS    Abdominal    Substance History - negative use     OB/GYN    (+) Pregnant,         Other - negative ROS                       Anesthesia Plan    ASA 2     epidural       Anesthetic plan, all risks, benefits, and alternatives have been provided, discussed and informed consent has been obtained with: patient and spouse/significant other.

## 2020-10-23 NOTE — ANESTHESIA PROCEDURE NOTES
Labor Epidural      Patient reassessed immediately prior to procedure    Patient location during procedure: OB  Performed By  Anesthesiologist: Juani Munoz DO  Preanesthetic Checklist  Completed: patient identified, surgical consent, pre-op evaluation, timeout performed, IV checked, risks and benefits discussed and monitors and equipment checked  Additional Notes  DPE #25g trino  Prep:  Pt Position:sitting  Sterile Tech:cap, gloves, mask and sterile barrier  Prep:DuraPrep  Monitoring:blood pressure monitoring  Epidural Block Procedure:  Approach:midline  Guidance:palpation technique  Location:L3-L4  Needle Type:Tuohy  Needle Gauge:17 G  Loss of Resistance Medium: air  Loss of Resistance: 5cm  Cath Depth at skin:11 cm  Paresthesia: none  Aspiration:negative  Test Dose:negative  Number of Attempts: 1  Post Assessment:  Dressing:occlusive dressing applied and secured with tape  Pt Tolerance:patient tolerated the procedure well with no apparent complications  Complications:no

## 2020-10-24 LAB
BASOPHILS # BLD AUTO: 0.02 10*3/MM3 (ref 0–0.2)
BASOPHILS NFR BLD AUTO: 0.2 % (ref 0–1.5)
DEPRECATED RDW RBC AUTO: 57.1 FL (ref 37–54)
EOSINOPHIL # BLD AUTO: 0.02 10*3/MM3 (ref 0–0.4)
EOSINOPHIL NFR BLD AUTO: 0.2 % (ref 0.3–6.2)
ERYTHROCYTE [DISTWIDTH] IN BLOOD BY AUTOMATED COUNT: 15.5 % (ref 12.3–15.4)
HCT VFR BLD AUTO: 31.7 % (ref 34–46.6)
HGB BLD-MCNC: 10.2 G/DL (ref 12–15.9)
IMM GRANULOCYTES # BLD AUTO: 0.06 10*3/MM3 (ref 0–0.05)
IMM GRANULOCYTES NFR BLD AUTO: 0.5 % (ref 0–0.5)
LYMPHOCYTES # BLD AUTO: 1.42 10*3/MM3 (ref 0.7–3.1)
LYMPHOCYTES NFR BLD AUTO: 12.2 % (ref 19.6–45.3)
MCH RBC QN AUTO: 32.4 PG (ref 26.6–33)
MCHC RBC AUTO-ENTMCNC: 32.2 G/DL (ref 31.5–35.7)
MCV RBC AUTO: 100.6 FL (ref 79–97)
MONOCYTES # BLD AUTO: 0.64 10*3/MM3 (ref 0.1–0.9)
MONOCYTES NFR BLD AUTO: 5.5 % (ref 5–12)
NEUTROPHILS NFR BLD AUTO: 81.4 % (ref 42.7–76)
NEUTROPHILS NFR BLD AUTO: 9.49 10*3/MM3 (ref 1.7–7)
NRBC BLD AUTO-RTO: 0 /100 WBC (ref 0–0.2)
PLATELET # BLD AUTO: 105 10*3/MM3 (ref 140–450)
PMV BLD AUTO: 9.8 FL (ref 6–12)
RBC # BLD AUTO: 3.15 10*6/MM3 (ref 3.77–5.28)
WBC # BLD AUTO: 11.65 10*3/MM3 (ref 3.4–10.8)

## 2020-10-24 PROCEDURE — 0503F POSTPARTUM CARE VISIT: CPT | Performed by: NURSE PRACTITIONER

## 2020-10-24 PROCEDURE — 85025 COMPLETE CBC W/AUTO DIFF WBC: CPT | Performed by: OBSTETRICS & GYNECOLOGY

## 2020-10-24 RX ADMIN — IBUPROFEN 600 MG: 600 TABLET, FILM COATED ORAL at 18:12

## 2020-10-24 RX ADMIN — WITCH HAZEL 1 PAD: 500 SOLUTION RECTAL; TOPICAL at 23:17

## 2020-10-24 RX ADMIN — DOCUSATE SODIUM 100 MG: 100 CAPSULE ORAL at 23:17

## 2020-10-24 RX ADMIN — HYDROCORTISONE 2.5% 1 APPLICATION: 25 CREAM TOPICAL at 23:17

## 2020-10-24 RX ADMIN — OXYCODONE HYDROCHLORIDE AND ACETAMINOPHEN 1 TABLET: 5; 325 TABLET ORAL at 06:21

## 2020-10-24 RX ADMIN — DOCUSATE SODIUM 100 MG: 100 CAPSULE ORAL at 10:26

## 2020-10-24 RX ADMIN — IBUPROFEN 600 MG: 600 TABLET, FILM COATED ORAL at 06:21

## 2020-10-24 RX ADMIN — OXYCODONE HYDROCHLORIDE AND ACETAMINOPHEN 1 TABLET: 5; 325 TABLET ORAL at 00:53

## 2020-10-24 RX ADMIN — OXYCODONE HYDROCHLORIDE AND ACETAMINOPHEN 1 TABLET: 5; 325 TABLET ORAL at 10:26

## 2020-10-24 RX ADMIN — IBUPROFEN 600 MG: 600 TABLET, FILM COATED ORAL at 23:18

## 2020-10-24 RX ADMIN — HYDROCODONE BITARTRATE AND ACETAMINOPHEN 1 TABLET: 5; 325 TABLET ORAL at 18:12

## 2020-10-24 RX ADMIN — IBUPROFEN 600 MG: 600 TABLET, FILM COATED ORAL at 00:53

## 2020-10-24 NOTE — PROGRESS NOTES
10/24/2020  PPD #1    Subjective   Arabella feels well.  Patient describes her lochia less than menses.  Pain is well controlled       Objective   Temp: Temp:  [98.2 °F (36.8 °C)-99.1 °F (37.3 °C)] 98.2 °F (36.8 °C) Temp src: Oral   BP: BP: (103-139)/() 111/59        Pulse: Heart Rate:  [] 85  RR: Resp:  [16-18] 16    General:  No acute distress   Abdomen: Fundus firm and beneath umbilicus   Pelvis: deferred     Lab Results   Component Value Date    WBC 11.65 (H) 10/24/2020    HGB 10.2 (L) 10/24/2020    HCT 31.7 (L) 10/24/2020    .6 (H) 10/24/2020     (L) 10/24/2020    HEPBSAG Negative 03/19/2020       Assessment  1. PPD# 1 after vaginal delivery   2. Platelets low-- 105  3. Baby girl well    Plan  1. Routine postpartum care.  2. CBC in am      This note has been electronically signed.    Tiffany Hope, APRN  October 24, 2020

## 2020-10-24 NOTE — LACTATION NOTE
10/24/20 1145   Maternal Information   Date of Referral 10/24/20   Person Making Referral other (see comments)  (courtesy)   Maternal Infant Feeding   Maternal Emotional State independent;receptive;relaxed   Milk Expression/Equipment   Breast Pump Type double electric, personal     Mom states baby has been sleepy at times but has also had some good feedings. Baby not 24 hrs old. Discussed the sleepy phase. Enc skin to skin frequently and with feeds, enc to observe for feeding cues and to wake if sleeping longer than 3 hrs. Teaching done.

## 2020-10-24 NOTE — PLAN OF CARE
Problem: Breastfeeding  Goal: Effective Breastfeeding  Outcome: Ongoing, Progressing  Intervention: Promote Breast Care and Comfort  Flowsheets (Taken 10/24/2020 1145)  Breast Care: Breastfeeding: frequency of feeding adjusted  Intervention: Promote Effective Breastfeeding  Flowsheets (Taken 10/24/2020 1145)  Breastfeeding Assistance:   feeding on demand promoted   feeding cue recognition promoted   feeding session observed  Parent/Child Attachment Promotion:   rooming-in promoted   positive reinforcement provided   skin-to-skin contact encouraged   participation in care promoted  Intervention: Support Exclusive Breastfeeding Success  Flowsheets (Taken 10/24/2020 1145)  Supportive Measures: positive reinforcement provided  Breastfeeding Support: lactation counseling provided   Goal Outcome Evaluation:  Plan of Care Reviewed With: patient, spouse  Progress: improving

## 2020-10-25 VITALS
HEIGHT: 63 IN | TEMPERATURE: 98.4 F | BODY MASS INDEX: 27.46 KG/M2 | RESPIRATION RATE: 16 BRPM | HEART RATE: 82 BPM | OXYGEN SATURATION: 100 % | DIASTOLIC BLOOD PRESSURE: 57 MMHG | WEIGHT: 155 LBS | SYSTOLIC BLOOD PRESSURE: 114 MMHG

## 2020-10-25 LAB
BASOPHILS # BLD AUTO: 0.03 10*3/MM3 (ref 0–0.2)
BASOPHILS NFR BLD AUTO: 0.3 % (ref 0–1.5)
DEPRECATED RDW RBC AUTO: 60 FL (ref 37–54)
EOSINOPHIL # BLD AUTO: 0.1 10*3/MM3 (ref 0–0.4)
EOSINOPHIL NFR BLD AUTO: 1.1 % (ref 0.3–6.2)
ERYTHROCYTE [DISTWIDTH] IN BLOOD BY AUTOMATED COUNT: 15.7 % (ref 12.3–15.4)
HCT VFR BLD AUTO: 31.1 % (ref 34–46.6)
HGB BLD-MCNC: 9.7 G/DL (ref 12–15.9)
IMM GRANULOCYTES # BLD AUTO: 0.04 10*3/MM3 (ref 0–0.05)
IMM GRANULOCYTES NFR BLD AUTO: 0.4 % (ref 0–0.5)
LYMPHOCYTES # BLD AUTO: 1.91 10*3/MM3 (ref 0.7–3.1)
LYMPHOCYTES NFR BLD AUTO: 21.4 % (ref 19.6–45.3)
MCH RBC QN AUTO: 32.4 PG (ref 26.6–33)
MCHC RBC AUTO-ENTMCNC: 31.2 G/DL (ref 31.5–35.7)
MCV RBC AUTO: 104 FL (ref 79–97)
MONOCYTES # BLD AUTO: 0.47 10*3/MM3 (ref 0.1–0.9)
MONOCYTES NFR BLD AUTO: 5.3 % (ref 5–12)
NEUTROPHILS NFR BLD AUTO: 6.38 10*3/MM3 (ref 1.7–7)
NEUTROPHILS NFR BLD AUTO: 71.5 % (ref 42.7–76)
NRBC BLD AUTO-RTO: 0 /100 WBC (ref 0–0.2)
PLATELET # BLD AUTO: 130 10*3/MM3 (ref 140–450)
PMV BLD AUTO: 10 FL (ref 6–12)
RBC # BLD AUTO: 2.99 10*6/MM3 (ref 3.77–5.28)
WBC # BLD AUTO: 8.93 10*3/MM3 (ref 3.4–10.8)

## 2020-10-25 PROCEDURE — 85025 COMPLETE CBC W/AUTO DIFF WBC: CPT | Performed by: NURSE PRACTITIONER

## 2020-10-25 PROCEDURE — 0503F POSTPARTUM CARE VISIT: CPT | Performed by: OBSTETRICS & GYNECOLOGY

## 2020-10-25 RX ORDER — ACETAMINOPHEN 500 MG
1000 TABLET ORAL EVERY 6 HOURS PRN
Status: DISCONTINUED | OUTPATIENT
Start: 2020-10-25 | End: 2020-10-25 | Stop reason: HOSPADM

## 2020-10-25 RX ORDER — IBUPROFEN 600 MG/1
600 TABLET ORAL EVERY 6 HOURS PRN
Qty: 30 TABLET | Refills: 0 | Status: SHIPPED | OUTPATIENT
Start: 2020-10-25

## 2020-10-25 RX ORDER — PSEUDOEPHEDRINE HCL 30 MG
100 TABLET ORAL 2 TIMES DAILY
Qty: 60 CAPSULE | Refills: 0 | Status: SHIPPED | OUTPATIENT
Start: 2020-10-25

## 2020-10-25 RX ADMIN — DOCUSATE SODIUM 100 MG: 100 CAPSULE ORAL at 10:28

## 2020-10-25 RX ADMIN — ACETAMINOPHEN 1000 MG: 500 TABLET, FILM COATED ORAL at 02:31

## 2020-10-25 RX ADMIN — ACETAMINOPHEN 1000 MG: 500 TABLET, FILM COATED ORAL at 10:28

## 2020-10-25 RX ADMIN — IBUPROFEN 600 MG: 600 TABLET, FILM COATED ORAL at 06:03

## 2020-10-25 NOTE — DISCHARGE SUMMARY
Discharge Summary    Date of Admission: 10/23/2020  Date of Discharge:  10/25/2020      Patient: Arabella Lau      MR#:[id number]    Delivery Provider: Lina Martinez     Discharge Surgeon/OB: Oxana Marquez MD      Presenting Problem/History of Present Illness  Normal labor [O80, Z37.9]     Patient Active Problem List   Diagnosis   • Gestational diabetes   • Normal labor         Discharge Diagnosis: Vaginal delivery at 38w5d    Procedures:  Vaginal, Spontaneous     10/23/2020    5:41 PM        Discharge Date: 10/25/2020;     Hospital Course  Patient is a 25 y.o. female  at 38w5d status post vaginal delivery without complication. Postpartum the patient did well. She remained afebrile, with vital signs stable. She was ready for discharge on postpartum day 2.     Infant:   female  fetus 3660 g (8 lb 1.1 oz)  with Apgar scores of 8 , 9  at five minutes.    Condition on Discharge:  Stable    Vital Signs  Temp:  [98 °F (36.7 °C)-98.5 °F (36.9 °C)] 98.5 °F (36.9 °C)  Heart Rate:  [78-88] 78  Resp:  [16] 16  BP: (110-112)/(67-69) 110/67    Lab Results   Component Value Date    WBC 8.93 10/25/2020    HGB 9.7 (L) 10/25/2020    HCT 31.1 (L) 10/25/2020    .0 (H) 10/25/2020     (L) 10/25/2020       Discharge Disposition  Home or Self Care    Discharge Medications     Discharge Medications      New Medications      Instructions Start Date   docusate sodium 100 MG capsule   100 mg, Oral, 2 Times Daily      ibuprofen 600 MG tablet  Commonly known as: ADVIL,MOTRIN   600 mg, Oral, Every 6 Hours PRN         Continue These Medications      Instructions Start Date   doxylamine 25 MG tablet  Commonly known as: UNISOM   25 mg, Oral, Nightly PRN      prenatal (CLASSIC) vitamin  tablet  Generic drug: prenatal vitamin   1 tablet, Oral, Daily      vitamin B-6 25 MG tablet  Commonly known as: PYRIDOXINE   25 mg, Oral, Daily             Discharge Diet:     Activity at Discharge:     Follow-up Appointments  Future  Appointments   Date Time Provider Department Center   10/29/2020 11:20 AM Lina Martinez MD [facility]  [facility]         Oxana Marquez MD  10/25/20  08:09 EDT  [facility]

## 2020-10-25 NOTE — PROGRESS NOTES
10/25/2020  PPD #1    Subjective   Arabella feels well.  Patient describes her bleeding as thin lochia.   Pain is well controlled  Breastfeeding: without difficulty.     Objective   Temp: Temp:  [98 °F (36.7 °C)-98.5 °F (36.9 °C)] 98.5 °F (36.9 °C) Temp src: Oral   BP: BP: (110-112)/(67-69) 110/67        Pulse: Heart Rate:  [78-88] 78  RR: Resp:  [16] 16    General:  well developed; well nourished  no acute distress   Abdomen: soft, non-tender; no masses  no umbilical or inguinal hernias are present  no hepato-splenomegaly   Pelvis: Clinical staff was present for exam     Lab Results   Component Value Date    WBC 8.93 10/25/2020    HGB 9.7 (L) 10/25/2020    HCT 31.1 (L) 10/25/2020    .0 (H) 10/25/2020     (L) 10/25/2020    HEPBSAG Negative 03/19/2020       Assessment  1. PPD# 1 after vaginal delivery    Plan  1. D/c home      This note has been electronically signed.    Oxana Marquez MD  October 25, 2020

## 2020-10-26 NOTE — PAYOR COMM NOTE
"Arabella Lau (25 y.o. Female)   Cert# per Availity [id number]  Notification of delivery of BABY girl on 10/23 vaginally,  Dr. Lina Martinez [id number]  Pt and baby dc home on 10/25/20.  Sloane Morillo RN  [phone]  [phone]    Date of Birth Social Security Number Address Home Phone MRN    [id number]  [address]  [place] [id number] [phone] [id number]    Jainism Marital Status          Taoism        Admission Date Admission Type Admitting Provider Attending Provider Department, Room/Bed    10/23/20 Elective Lina Martinez MD  [facility] MOTHER BABY 4B, N428/1    Discharge Date Discharge Disposition Discharge Destination        10/25/2020 Home or Self Care              Attending Provider: (none)   Allergies: No Known Allergies    Isolation: None   Infection: None   Code Status: Prior    Ht: 160 cm (63\")   Wt: 70.3 kg (155 lb)    Admission Cmt: None   Principal Problem: None                Active Insurance as of 10/23/2020     Primary Coverage     Payor Plan Insurance Group Employer/Plan Group    [place] [id number]     Payor Plan Address Payor Plan Phone Number Payor Plan Fax Number Effective Dates    [employer] Claims Office - [address] [address] [phone]  3/1/2020 - None Entered    [facility] [id number]       Subscriber Name Subscriber Birth Date Member ID       ARABELLA LAU 1995 [id number]                 Emergency Contacts      (Rel.) Home Phone Work Phone Mobile Phone    Guy Lau (Spouse) -- -- [phone]            Problem List           Codes Noted - Resolved       Hospital    Normal labor ICD-10-CM: O80, Z37.9  ICD-9-CM: 650 10/23/2020 - Present       Non-Hospital    Gestational diabetes ICD-10-CM: O24.419  ICD-9-CM: 648.80 8/24/2020 - Present             History & Physical      H&P signed by New Onbase, [employer] at 10/23/20 0848      Attestation signed by Lina Martinez MD at 10/23/20 1132    No changes to " prenatal record. SROM, spontaneous labor. Gbs+- on prophylaxis, FSBS q2 hours for GDM.              Scan on 10/23/2020 by New Onbase, [place]: PRENATAL FLOWSHEET 10/23/2020          Electronically signed by Lina Martinez MD at 10/23/20 1134         Facility-Administered Medications as of 10/25/2020   Medication Dose Route Frequency Provider Last Rate Last Dose   • [COMPLETED] lactated ringers bolus 1,000 mL  1,000 mL Intravenous Once PRN HighPeng MD 4,000 mL/hr at 10/23/20 0520 1,000 mL at 10/23/20 0520   • [COMPLETED] lactated ringers bolus 1,000 mL  1,000 mL Intravenous PRN Juani Munoz DO 4,000 mL/hr at 10/23/20 0546 1,000 mL at 10/23/20 0546   • [COMPLETED] mineral oil liquid 30 mL  30 mL Topical Once High, Peng SANDERS MD   30 mL at 10/23/20 1700   • [COMPLETED] ondansetron (ZOFRAN) injection 4 mg  4 mg Intravenous Once PRN Juani Munoz DO   4 mg at 10/23/20 1423   • [COMPLETED] oxytocin in sodium chloride (PITOCIN) 30 UNIT/500ML infusion solution  650 mL/hr Intravenous Once High, Peng SANDERS  mL/hr at 10/23/20 1745 650 mL/hr at 10/23/20 1745    Followed by   • [COMPLETED] oxytocin in sodium chloride (PITOCIN) 30 UNIT/500ML infusion solution  85 mL/hr Intravenous Once High, Peng SANDERS MD 85 mL/hr at 10/23/20 1942 85 mL/hr at 10/23/20 1942   • [COMPLETED] penicillin g 5 mu/100 mL 0.9% NS IVPB (mbp)  5 Million Units Intravenous Once High, Peng SANDERS MD   5 Million Units at 10/23/20 0546     Lab Results (last 72 hours)     Procedure Component Value Units Date/Time    CBC & Differential [[id number]]  (Abnormal) Collected: 10/25/20 0626    Specimen: Blood Updated: 10/25/20 0745    Narrative:      The following orders were created for panel order CBC & Differential.  Procedure                               Abnormality         Status                     ---------                               -----------         ------                     CBC Auto Differential[[id number]]        Abnormal             Final result                 Please view results for these tests on the individual orders.    CBC Auto Differential [[id number]]  (Abnormal) Collected: 10/25/20 0626    Specimen: Blood Updated: 10/25/20 0745     WBC 8.93 10*3/mm3      RBC 2.99 10*6/mm3      Hemoglobin 9.7 g/dL      Hematocrit 31.1 %      .0 fL      MCH 32.4 pg      MCHC 31.2 g/dL      RDW 15.7 %      RDW-SD 60.0 fl      MPV 10.0 fL      Platelets 130 10*3/mm3      Neutrophil % 71.5 %      Lymphocyte % 21.4 %      Monocyte % 5.3 %      Eosinophil % 1.1 %      Basophil % 0.3 %      Immature Grans % 0.4 %      Neutrophils, Absolute 6.38 10*3/mm3      Lymphocytes, Absolute 1.91 10*3/mm3      Monocytes, Absolute 0.47 10*3/mm3      Eosinophils, Absolute 0.10 10*3/mm3      Basophils, Absolute 0.03 10*3/mm3      Immature Grans, Absolute 0.04 10*3/mm3      nRBC 0.0 /100 WBC     CBC & Differential [[id number]]  (Abnormal) Collected: 10/24/20 0514    Specimen: Blood Updated: 10/24/20 0601    Narrative:      The following orders were created for panel order CBC & Differential.  Procedure                               Abnormality         Status                     ---------                               -----------         ------                     CBC Auto Differential[[id number]]        Abnormal            Final result                 Please view results for these tests on the individual orders.    CBC Auto Differential [[id number]]  (Abnormal) Collected: 10/24/20 0514    Specimen: Blood Updated: 10/24/20 0601     WBC 11.65 10*3/mm3      RBC 3.15 10*6/mm3      Hemoglobin 10.2 g/dL      Hematocrit 31.7 %      .6 fL      MCH 32.4 pg      MCHC 32.2 g/dL      RDW 15.5 %      RDW-SD 57.1 fl      MPV 9.8 fL      Platelets 105 10*3/mm3      Neutrophil % 81.4 %      Lymphocyte % 12.2 %      Monocyte % 5.5 %      Eosinophil % 0.2 %      Basophil % 0.2 %      Immature Grans % 0.5 %      Neutrophils, Absolute 9.49 10*3/mm3      Lymphocytes, Absolute 1.42  10*3/mm3      Monocytes, Absolute 0.64 10*3/mm3      Eosinophils, Absolute 0.02 10*3/mm3      Basophils, Absolute 0.02 10*3/mm3      Immature Grans, Absolute 0.06 10*3/mm3      nRBC 0.0 /100 WBC     COVID PRE-OP / PRE-PROCEDURE SCREENING ORDER (NO ISOLATION) - Swab, Nasopharynx [[id number]] Collected: 10/23/20 0747    Specimen: Swab from Nasopharynx Updated: 10/23/20 1850    Narrative:      The following orders were created for panel order COVID PRE-OP / PRE-PROCEDURE SCREENING ORDER (NO ISOLATION) - Swab, Nasopharynx.  Procedure                               Abnormality         Status                     ---------                               -----------         ------                     COVID-19,[facility], NP ...[[id number]]                      Final result                 Please view results for these tests on the individual orders.    COVID-19,[facility], NP SWAB IN [facility]EXAR VIRAL TRANSPORT MEDIA 24-30 HR TAT - Swab, Nasopharynx [[id number]] Collected: 10/23/20 0747    Specimen: Swab from Nasopharynx Updated: 10/23/20 1850     SARS-CoV-2 ZULMA Not Detected    POC Glucose Once [[id number]]  (Normal) Collected: 10/23/20 1614    Specimen: Blood Updated: 10/23/20 1616     Glucose 80 mg/dL     POC Glucose Once [[id number]]  (Normal) Collected: 10/23/20 1225    Specimen: Blood Updated: 10/23/20 1234     Glucose 94 mg/dL     POC Glucose Once [[id number]]  (Normal) Collected: 10/23/20 1000    Specimen: Blood Updated: 10/23/20 1001     Glucose 89 mg/dL     POC Glucose Once [[id number]]  (Normal) Collected: 10/23/20 0743    Specimen: Blood Updated: 10/23/20 0744     Glucose 91 mg/dL     RPR [[id number]] Resulted: 03/19/20     Specimen: Blood Updated: 10/23/20 0617     External RPR Non-Reactive    Rubella Antibody, IgG [[id number]] Resulted: 03/19/20     Specimen: Blood Updated: 10/23/20 0617     External Rubella Qual Immune    HIV-1 Antibody, EIA [[id number]] Resulted: 03/19/20     Specimen: Blood Updated: 10/23/20 0617     External  HIV Antibody Non-Reactive    Urine Drug Screen - Urine, Clean Catch [[id number]] Resulted: 03/19/20     Specimen: Urine, Clean Catch Updated: 10/23/20 0617     External Urine Drug Screen Negative    Hepatitis C Antibody [[id number]] Resulted: 03/19/20     Specimen: Blood Updated: 10/23/20 0617     External Hepatitis C Ab Negative    GTT 3 Hour [[id number]] Resulted: 08/21/20     Specimen: Blood Updated: 10/23/20 0617     EXTGTT3 192, 170, 61    GTT Fasting [[id number]] Resulted: 08/21/20     Specimen: Blood Updated: 10/23/20 0617     External Glucose, GTT - Fasting 78 mg/dL     GTT 1 Hour [[id number]] Resulted: 08/13/20     Specimen: Blood Updated: 10/23/20 0617     External GTT 1 Hour 183mg/dL    Hepatitis B Surface Antigen [[id number]] Resulted: 03/19/20     Specimen: Blood Updated: 10/23/20 0617     External Hepatitis B Surface Ag Negative    Chlamydia trachomatis, Neisseria gonorrhoeae, PCR w/ confirmation - Swab, Vagina [[id number]] Resulted: 03/19/20     Specimen: Swab from Vagina Updated: 10/23/20 0617     External Chlamydia Screen Negative    Gonorrhea Screen - Swab, [[id number]] Resulted: 03/19/20     Specimen: Swab Updated: 10/23/20 0617     External Gonorrhea Screen Negative    Preeclampsia Panel [[id number]]  (Abnormal) Collected: 10/23/20 0526    Specimen: Blood Updated: 10/23/20 0608     Alkaline Phosphatase 150 U/L      ALT (SGPT) 15 U/L      Comment: Specimen hemolyzed.  Results may be affected.        AST (SGOT) 33 U/L      Comment: Specimen hemolyzed.  Results may be affected.        Creatinine 0.55 mg/dL      Total Bilirubin 0.2 mg/dL       U/L      Comment: Specimen hemolyzed.  Results may be affected.        Uric Acid 4.6 mg/dL     CBC (No Diff) [[id number]]  (Abnormal) Collected: 10/23/20 0526    Specimen: Blood Updated: 10/23/20 0558     WBC 11.47 10*3/mm3      RBC 3.83 10*6/mm3      Hemoglobin 12.9 g/dL      Hematocrit 38.1 %      MCV 99.5 fL      MCH 33.7 pg      MCHC 33.9 g/dL      RDW 14.7 %       RDW-SD 53.7 fl      MPV 10.0 fL      Platelets 165 10*3/mm3           Imaging Results (Last 72 Hours)     ** No results found for the last 72 hours. **        ECG/EMG Results (last 72 hours)     ** No results found for the last 72 hours. **           Operative/Procedure Notes (last 72 hours) (Notes from 10/23/20 0905 through 10/26/20 0905)      Lina Martinez MD at 10/23/20 1801            10/23/2020    Patient:Arabella Lau    MR#:[id number]    Vaginal Delivery Note  25 y.o. yo female  at 38w5d    Patient Active Problem List   Diagnosis   • Gestational diabetes   • Normal labor       Delivery     Delivery: Vaginal, Spontaneous     YOB: 2020    Time of Birth: 5:41 PM      Anesthesia: Epidural     Delivering clinician:     Forceps?   No   Vacuum? No    Shoulder dystocia present: No          Infant    Findings: female  infant     Infant observations: Weight: 3660 g (8 lb 1.1 oz)     Observations/Comments:        Apgars: 8  @ 1 minute /    9  @ 5 minutes         Placenta, Cord, and Fluid    Placenta delivered  Spontaneous  at  10/23/2020  5:45 PM     Cord:   present.   Nuchal Cord?  yes; Number of nuchal loops present:  1    Cord blood obtained:     Cord gases obtained:      Cord gas results: Pending         Repair    Episiotomy: Yes    Lacerations: Yes  Laceration Information  Laceration Repaired?   Perineal: 2nd  Yes    Periurethral:       Labial:       Sulcus:       Vaginal:       Cervical:         Suture used for repair: 2-0 Vicryl   Estimated Blood Loss: 200  mls.         Complications  none    Disposition  Mother to Mother Baby/Postpartum  in stable condition currently.  Baby to remains with mom  in stable condition currently.    Description of Delivery  Patient pushed well  and delivered easily from oa presentation.  There was a nuchal x 1, reduced at perineum.  Shoulders delivered easily. Nose and mouth was bulb suctioned and baby was placed on mother's abdomen.  Cord delayed,  clamped  and cut.         Lina Martinez MD  10/23/20  18:02 EDT            Electronically signed by Lina Martinez MD at 10/23/20 1803          Physician Progress Notes (last 72 hours) (Notes from 10/23/20 0905 through 10/26/20 0905)      Oxana Marquez MD at 10/25/20 0807          10/25/2020  PPD #1    Subjective   Arabella feels well.  Patient describes her bleeding as thin lochia.   Pain is well controlled  Breastfeeding: without difficulty.     Objective   Temp: Temp:  [98 °F (36.7 °C)-98.5 °F (36.9 °C)] 98.5 °F (36.9 °C) Temp src: Oral   BP: BP: (110-112)/(67-69) 110/67        Pulse: Heart Rate:  [78-88] 78  RR: Resp:  [16] 16    General:  well developed; well nourished  no acute distress   Abdomen: soft, non-tender; no masses  no umbilical or inguinal hernias are present  no hepato-splenomegaly   Pelvis: Clinical staff was present for exam     Lab Results   Component Value Date    WBC 8.93 10/25/2020    HGB 9.7 (L) 10/25/2020    HCT 31.1 (L) 10/25/2020    .0 (H) 10/25/2020     (L) 10/25/2020    HEPBSAG Negative 03/19/2020       Assessment  1. PPD# 1 after vaginal delivery    Plan  1. D/c home      This note has been electronically signed.    Oxana Marquez MD  October 25, 2020       Electronically signed by Oxana Marquez MD at 10/25/20 0808     Herminia, MICHELE Chung at 10/24/20 0803          10/24/2020  PPD #1    Subjective   Arabella feels well.  Patient describes her lochia less than menses.  Pain is well controlled       Objective   Temp: Temp:  [98.2 °F (36.8 °C)-99.1 °F (37.3 °C)] 98.2 °F (36.8 °C) Temp src: Oral   BP: BP: (103-139)/() 111/59        Pulse: Heart Rate:  [] 85  RR: Resp:  [16-18] 16    General:  No acute distress   Abdomen: Fundus firm and beneath umbilicus   Pelvis: deferred     Lab Results   Component Value Date    WBC 11.65 (H) 10/24/2020    HGB 10.2 (L) 10/24/2020    HCT 31.7 (L) 10/24/2020    .6 (H) 10/24/2020     (L) 10/24/2020     HEPBSAG Negative 03/19/2020       Assessment  2. PPD# 1 after vaginal delivery   3. Platelets low-- 105  4. Baby girl well    Plan  2. Routine postpartum care.  3. CBC in am      This note has been electronically signed.    MICHELE Jane  October 24, 2020    Electronically signed by Tiffany Hope APRN at 10/24/20 0804          Nursing Assessments (last 72 hours)      OB PCS Body System     Row Name 10/25/20 1248 10/25/20 1144 10/25/20 1028 10/25/20 1003 10/25/20 0910       Pain/Comfort/Sleep    Preferred Pain Scale  number (Numeric Rating Pain Scale)  --  --  number (Numeric Rating Pain Scale)  number (Numeric Rating Pain Scale)    Sleep/Rest/Relaxation  --  --  --  awake  awake       Coping/Psychosocial    Observed Emotional State  --  calm;cooperative  --  --  cooperative;calm    Family/Support Persons  --  --  --  --  spouse    Involvement in Care  --  --  --  --  at bedside;attentive to patient;interacting with patient;participating in care;supportive of patient       Cognitive    Cognitive/Neuro/Behavioral WDL  --  --  --  --  WDL       Behavior WDL    Behavior WDL  --  --  --  --  WDL       Respiratory    Respiratory WDL  --  --  --  --  WDL       Cardiac    Cardiac WDL  --  --  --  --  WDL       Peripheral Neurovascular    Peripheral Neurovascular WDL  --  --  --  --  WDL       Gastrointestinal    Gastrointestinal WDL  --  --  --  --  WDL       Rectum WDL    Rectal Area WDL  --  --  --  --  WDL       Genitourinary    Genitourinary WDL  --  --  --  --  WDL    Voiding Characteristics  --  --  --  --  voids spontaneously without difficulty       Reproductive    Uterus WDL  --  --  --  --  WDL    Uterus Position  --  --  --  --  midline    Uterus Consistency  --  --  --  --  firm without massage    Fundal Height  --  --  --  --  1 cm below umbilicus       Lochia 1-3 Days WDL    Lochia 1-3 Days WDL  --  --  --  --  WDL    Lochia Color  --  --  --  --  rubra    Lochia Amount  --  --  --  --  light (less  than 10 cm on pad/hr)    Lochia Odor  --  --  --  --  none       Perineum WDL    Perineum WDL  --  --  --  --  WDL except;appearance;episiotomy    Perineum Appearance  --  --  --  --  swelling;redness    Episiotomy Appearance  --  --  --  --  swelling    Episiotomy Type  --  --  --  --  midline 2'       Breasts WDL    Breast WDL  --  --  --  --  WDL;all       Skin    Skin WDL  --  --  --  --  WDL       Reza Risk Assessment    Sensory Perception  --  --  --  --  4-->no impairment    Moisture  --  --  --  --  4-->rarely moist    Activity  --  --  --  --  4-->walks frequently    Mobility  --  --  --  --  4-->no limitation    Nutrition  --  --  --  --  4-->excellent    Friction and Shear  --  --  --  --  3-->no apparent problem    Reza Score  --  --  --  --  23       Musculoskeletal    Musculoskeletal WDL  --  --  --  --  WDL       Safety    Safety WDL  WDL  WDL  WDL  WDL  WDL    Safety Factors  --  call light in reach  bed in low position  bed in low position  call light in reach;bed in low position       [facility] High Risk Falls Assessment (If Fall score is >/=13, add the Fall Risk CPG to the care plan)     Fallen in past 6 months  --  --  --  --  0--> No    Mental Status  --  --  --  --  0--> no mental status change    Elimination  --  --  --  --  0--> No elimination issues    Mobility  --  --  --  --  0--> No mobility issues    Medications  --  --  --  --  0--> No meds    Nurses' Clinical Judgement  --  --  --  --  0    Total Fall Risk Score  --  --  --  --  0       Safety Management    Safety Promotion/Fall Prevention  safety round/check completed  safety round/check completed  safety round/check completed  safety round/check completed  safety round/check completed       Daily Care    Activity Management  up ad jeronimo  up ad jeronimo  up ad jeronimo  up ad jeronimo  up ad jeronimo    Activity Assistance Provided  --  --  --  independent  independent    Row Name 10/25/20 0831 10/25/20 0821 10/25/20 0716 10/25/20 0603 10/25/20  0500       Pain/Comfort/Sleep    Preferred Pain Scale  --  --  --  number (Numeric Rating Pain Scale)  --    (0-10) Pain Rating: Activity  --  --  --  --  0    Pain Location  --  --  --  abdomen  --    Pain Description  --  --  --  intermittent;cramping  --    Pain Management Interventions  --  --  --  see MAR  --    Sleep/Rest/Relaxation  --  --  --  awake  appears asleep       Safety    Safety WDL  WDL  WDL  WDL  --  --    Safety Factors  bed in low position  bed in low position;call light in reach breastfeeding  call light in reach  --  --       Safety Management    Safety Promotion/Fall Prevention  safety round/check completed  safety round/check completed  safety round/check completed  safety round/check completed  safety round/check completed    Row Name 10/25/20 0400 10/25/20 0330 10/25/20 0231 10/25/20 0200 10/25/20 0000       Pain/Comfort/Sleep    Preferred Pain Scale  --  --  number (Numeric Rating Pain Scale)  number (Numeric Rating Pain Scale)  --    (0-10) Pain Rating: Rest  --  --  --  1  --    (0-10) Pain Rating: Activity  0  0  --  --  0    Pain Location  --  --  perineum  --  --    Pain Description  --  --  aching;intermittent  --  --    Pain Management Interventions  --  --  see MAR  --  --    Sleep/Rest/Relaxation  appears asleep  appears asleep  awake  awake  appears asleep       Safety Management    Safety Promotion/Fall Prevention  safety round/check completed  safety round/check completed  safety round/check completed  safety round/check completed  safety round/check completed       Provider Notification    Reason for Communication  --  --  Patient Pain  --  --    Provider Name  --  --  -- obroin  --  --    Notification Route  --  --  Phone call  --  --    Response  --  --  See orders  --  --    Row Name 10/24/20 2317 10/24/20 2200 10/24/20 2100 10/24/20 2030 10/24/20 1910       Pain/Comfort/Sleep    Preferred Pain Scale  number (Numeric Rating Pain Scale)  number (Numeric Rating Pain Scale)   number (Numeric Rating Pain Scale)  number (Numeric Rating Pain Scale)  number (Numeric Rating Pain Scale)    (0-10) Pain Rating: Rest  --  1  1  1  1    Pain Location  perineum  --  --  --  --    Pain Description  aching;intermittent  --  --  --  --    Pain Management Interventions  see MAR;position adjusted;pillow support provided  --  -- instr. sitz  --  --    Sleep/Rest/Relaxation  awake  awake  awake  awake  awake       Coping/Psychosocial    Observed Emotional State  --  --  cooperative;calm  --  --    Family/Support Persons  --  --  spouse  --  --    Involvement in Care  --  --  at bedside;attentive to patient;interacting with patient;participating in care;supportive of patient  --  --       Cognitive    Cognitive/Neuro/Behavioral WDL  --  --  WDL  --  --       Behavior WDL    Behavior WDL  --  --  WDL  --  --       Respiratory    Respiratory WDL  --  --  WDL  --  --       Cardiac    Cardiac WDL  --  --  WDL  --  --       Peripheral Neurovascular    Peripheral Neurovascular WDL  --  --  WDL  --  --       Gastrointestinal    Gastrointestinal WDL  --  --  WDL  --  --       Rectum WDL    Rectal Area WDL  --  --  WDL  --  --       Genitourinary    Genitourinary WDL  --  --  WDL  --  --    Voiding Characteristics  --  --  voids spontaneously without difficulty  --  --       Reproductive    Uterus WDL  --  --  WDL  --  --    Uterus Position  --  --  midline  --  --    Uterus Consistency  --  --  firm without massage  --  --    Fundal Height  --  --  1 cm below umbilicus  --  --       Lochia 1-3 Days WDL    Lochia 1-3 Days WDL  --  --  WDL  --  --    Lochia Color  --  --  rubra  --  --    Lochia Amount  --  --  light (less than 10 cm on pad/hr)  --  --    Lochia Odor  --  --  none  --  --       Perineum WDL    Perineum WDL  --  --  WDL except;appearance;episiotomy  --  --    Perineum Appearance  --  --  swelling;redness  --  --    Episiotomy Appearance  --  --  swelling  --  --    Episiotomy Type  --  --  midline 2'   --  --       Breasts WDL    Breast WDL  --  --  WDL;all  --  --    Left Nipple Symptoms  --  --  redness;tender  --  --    Right Nipple Symptoms  --  --  redness;tender  --  --       Breast Pumping    Breast Pumping  --  --  manual breast pump utilized  --  --       Reproductive Interventions    Breastfeeding Assistance  --  --  assisted with positioning;assisted with techniques for flat/inverted nipples;feeding cue recognition promoted;feeding on demand promoted;feeding session observed;infant latch-on verified;infant stimulated to wakeful state;infant suck/swallow verified;nipple shield utilized;supplemental feeding provided;support offered  --  --       Skin    Skin WDL  --  --  WDL  --  --       Reza Risk Assessment    Sensory Perception  --  --  4-->no impairment  --  --    Moisture  --  --  4-->rarely moist  --  --    Activity  --  --  4-->walks frequently  --  --    Mobility  --  --  4-->no limitation  --  --    Nutrition  --  --  4-->excellent  --  --    Friction and Shear  --  --  3-->no apparent problem  --  --    Reza Score  --  --  23  --  --       Musculoskeletal    Musculoskeletal WDL  --  --  WDL  --  --       Nutrition    Diet/Nutrition Received  --  --  --  --  regular;gluten free       Maternal Sepsis Screening Tool    Previous maternal screen positive in last 48 hrs?  --  --  no  --  --    Are 2 or > of the above criteria present?  --  --  no: STOP/negative screen  --  --       Safety    Safety Factors  --  --  --  --  ID band on;upper side rails raised x 2;call light in reach;wheels locked;bed in low position    Falls Risk Assessment: Patient Age  --  --  [facility] High Risk Falls Assessment  --  --       [facility] High Risk Falls Assessment (If Fall score is >/=13, add the Fall Risk CPG to the care plan)     Fallen in past 6 months  --  --  0--> No  --  --    Mental Status  --  --  0--> no mental status change  --  --    Elimination  --  --  0--> No elimination issues  --  --     Mobility  --  --  0--> No mobility issues  --  --    Medications  --  --  1--> Narcotics  --  --    Nurses' Clinical Judgement  --  --  0  --  --    Total Fall Risk Score  --  --  1  --  --       Safety Management    Safety Promotion/Fall Prevention  safety round/check completed  safety round/check completed  safety round/check completed  safety round/check completed  safety round/check completed       Daily Care    Activity Management  --  --  ambulated in room  --  --       Hygiene Care    Bathing/Skin Care  --  --  --  shampoo;dressed/undressed;shower  --    Perineal Care  --  --  --  absorbent pad changed;perineum cleansed  --    Row Name 10/24/20 1812 10/24/20 1646 10/24/20 1616 10/24/20 1446 10/24/20 1303       Pain/Comfort/Sleep    Preferred Pain Scale  number (Numeric Rating Pain Scale)  --  --  --  number (Numeric Rating Pain Scale)    (0-10) Pain Rating: Rest  --  --  1  --  0    Pain Location  perineum  --  abdomen  --  --    Pain Radiation to  abdomen  --  --  --  --    Pain Management Interventions  see MAR  --  --  --  --       Coping/Psychosocial    Observed Emotional State  --  --  calm;cooperative  calm;cooperative  --       Safety    Safety WDL  WDL  WDL  WDL  WDL  WDL    Safety Factors  --  --  --  --  bed in low position;call light in reach       Safety Management    Safety Promotion/Fall Prevention  safety round/check completed  safety round/check completed  safety round/check completed  safety round/check completed  safety round/check completed       Daily Care    Activity Management  up ad jeronimo  -- asleep  up ad jeronimo  up ad jeronimo  up ad jeronimo       Positioning    Body Position  --  --  supine  supine  supine    Row Name 10/24/20 1236 10/24/20 1113 10/24/20 1026 10/24/20 0839 10/24/20 0710       Pain/Comfort/Sleep    Preferred Pain Scale  --  number (Numeric Rating Pain Scale)  number (Numeric Rating Pain Scale)  --  --    (0-10) Pain Rating: Rest  --  3  --  --  --    Pain Location  --  --  abdomen   --  --    Pain Description  --  intermittent  intermittent  --  --    Pain Management Interventions  --  --  see MAR  --  --    Sleep/Rest/Relaxation  --  --  --  awake  --       Coping/Psychosocial    Observed Emotional State  --  cooperative  --  cooperative  --    Verbalized Emotional State  --  --  --  acceptance;hopefulness  --    Family/Support Persons  --  --  --  spouse  --    Involvement in Care  --  --  --  at bedside;attentive to patient  --       Cognitive    Cognitive/Neuro/Behavioral WDL  --  --  --  WDL  --       Behavior WDL    Behavior WDL  --  --  --  WDL  --       Respiratory    Respiratory WDL  --  --  --  WDL  --       Breath Sounds    Breath Sounds  --  --  --  All Fields  --    All Lung Fields Breath Sounds  --  --  --  Anterior:;Lateral:;clear;equal bilaterally  --       Cardiac    Cardiac WDL  --  --  --  WDL  --       Peripheral Neurovascular    Peripheral Neurovascular WDL  --  --  --  WDL  --       Gastrointestinal    Gastrointestinal WDL  --  --  --  WDL  --       Rectum WDL    Rectal Area WDL  --  --  --  WDL  --       Genitourinary    Genitourinary WDL  --  --  --  WDL  --       Reproductive    Uterus WDL  --  --  --  WDL  --    Uterus Position  --  --  --  midline  --    Uterus Consistency  --  --  --  firm without massage  --    Fundal Height  --  --  --  1 cm below umbilicus  --       Lochia 1-3 Days WDL    Lochia 1-3 Days WDL  --  --  --  WDL  --    Lochia Color  --  --  --  rubra  --    Lochia Amount  --  --  --  light (less than 10 cm on pad/hr)  --    Lochia Odor  --  --  --  none  --       Perineum WDL    Perineum WDL  --  --  --  WDL except;appearance;episiotomy  --    Perineum Appearance  --  --  --  swelling;redness  --    Episiotomy Appearance  --  --  --  swelling;no bruising;no drainage  --    Episiotomy Type  --  --  --  midline  --       Breasts WDL    Breast WDL  --  --  --  WDL  --       Skin    Skin WDL  --  --  --  WDL  --       Reza Risk Assessment    Sensory  Perception  --  --  --  4-->no impairment  --    Moisture  --  --  --  4-->rarely moist  --    Activity  --  --  --  4-->walks frequently  --    Mobility  --  --  --  4-->no limitation  --    Nutrition  --  --  --  4-->excellent  --    Friction and Shear  --  --  --  3-->no apparent problem  --    Reza Score  --  --  --  23  --       Musculoskeletal    Musculoskeletal WDL  --  --  --  WDL  --       Nutrition    Diet/Nutrition Received  --  --  --  regular;gluten free  --       Maternal Sepsis Screening Tool    Previous maternal screen positive in last 48 hrs?  --  --  --  no  --    Are 2 or > of the above criteria present?  --  --  --  no: STOP/negative screen  --       Safety    Safety WDL  WDL  WDL  WDL  WDL  WDL    Safety Factors  --  bed in low position  call light in reach;bed in low position  --  --       [facility] High Risk Falls Assessment (If Fall score is >/=13, add the Fall Risk CPG to the care plan)     Fallen in past 6 months  --  --  --  0--> No  --    Mental Status  --  --  --  0--> no mental status change  --    Elimination  --  --  --  0--> No elimination issues  --    Mobility  --  --  --  0--> No mobility issues  --    Medications  --  --  --  0--> No meds  --    Nurses' Clinical Judgement  --  --  --  0  --    Total Fall Risk Score  --  --  --  0  --       Safety Management    Safety Promotion/Fall Prevention  safety round/check completed  safety round/check completed  safety round/check completed  safety round/check completed  safety round/check completed       Daily Care    Activity Management  up ad jeronimo  up ad jeronimo  up ad jeronimo  up ad jeronimo  up ad jeronimo    Activity Assistance Provided  independent  independent  independent  independent  independent    Row Name 10/24/20 0621 10/24/20 0407 10/24/20 0242 10/24/20 0200 10/24/20 0100       Pain/Comfort/Sleep    Preferred Pain Scale  number (Numeric Rating Pain Scale)  --  --  --  --    (0-10) Pain Rating: Rest  --  --  --  0  --    Pain Location   perineum  --  --  --  --    Pain Description  aching;sore  --  --  --  --    Nonverbal Indicators of Pain  --  nonverbal indicators absent  --  --  --    Pain Management Interventions  see MAR  --  --  --  --    POSS (Pasero Opioid-Induced Sed Scale)  1 - Awake and alert  --  --  --  --    Sleep/Rest/Relaxation  awake  appears asleep  --  awake  --       Reproductive    Uterus WDL  --  --  --  --  WDL    Uterus Position  --  --  --  --  midline    Uterus Consistency  --  --  --  --  firm without massage    Fundal Height  --  --  --  --  1 cm below umbilicus       Lochia 1-3 Days WDL    Lochia 1-3 Days WDL  --  --  --  --  WDL    Lochia Color  --  --  --  --  rubra    Lochia Amount  --  --  --  --  light (less than 10 cm on pad/hr)    Lochia Odor  --  --  --  --  none       [REMOVED] Peripheral IV 10/23/20 0520 Right Forearm    IV Properties Placement Date: 10/23/20 Placement Time: 0520 Hand Hygiene Completed: Yes Size (Gauge): 18 G Orientation: Right Location: Forearm Local Anesthetic: None Technique: Anatomical landmarks Total insertion attempts: 1 Patient Tolerance: Tolerated well Removal Date: 10/24/20 Removal Time: 0106 Removed : Per protocol    Site Assessment  --  --  --  --  Clean;Dry;Intact    Dressing Type  --  --  --  --  Transparent    Line Status  --  --  --  --  Infusing    Dressing Status  --  --  --  --  Clean;Dry;Intact    Reason Not Rotated  --  --  --  --  Not due    Phlebitis  --  --  --  --  0-->no symptoms       Safety    Safety WDL  WDL  WDL  --  WDL  WDL       Safety Management    Safety Promotion/Fall Prevention  safety round/check completed  safety round/check completed  safety round/check completed  safety round/check completed  safety round/check completed       Daily Care    Activity Assistance Provided  --  --  --  --  independent gait steady    Row Name 10/24/20 0053 10/24/20 0005 10/23/20 2200 10/23/20 2100 10/23/20 2000       Pain/Comfort/Sleep    Preferred Pain Scale  number (Numeric  Rating Pain Scale)  --  --  --  --    (0-10) Pain Rating: Rest  --  0  0  0  0    Pain Location  perineum  --  --  --  --    Pain Description  aching;sore  --  --  --  --    Pain Management Interventions  see MAR  --  --  --  --    POSS (Pasero Opioid-Induced Sed Scale)  1 - Awake and alert  --  --  --  --    Sleep/Rest/Relaxation  awake  awake  awake  awake  awake       Tami Postanesthesia Score    Activity  --  --  --  --  2-->moves 4 extremities voluntarily or on command    Respiration  --  --  --  --  2-->able to breathe and cough freely    Circulation  --  --  --  --  2-->BP within 20% of preanesthetic level    Consciousness  --  --  --  --  2-->fully awake    O2 Saturation  --  --  --  --  2-->able to maintain SaO2 above 92% on room air    Tami Score  --  --  --  --  10       Coping/Psychosocial    Observed Emotional State  --  --  --  --  cooperative;tearful/crying    Verbalized Emotional State  --  --  --  --  acceptance;hopefulness    Trust Relationship/Rapport  --  --  --  --  care explained    Family/Support Persons  --  --  --  --  spouse    Involvement in Care  --  --  --  --  at bedside;attentive to patient       Cognitive    Cognitive/Neuro/Behavioral WDL  --  --  --  --  WDL       Behavior WDL    Behavior WDL  --  --  --  --  WDL       Respiratory    Respiratory WDL  --  --  --  --  WDL       Breath Sounds    Breath Sounds  --  --  --  --  All Fields    All Lung Fields Breath Sounds  --  --  --  --  Anterior:;Lateral:;clear;equal bilaterally       Cardiac    Cardiac WDL  --  --  --  --  WDL       Peripheral Neurovascular    Peripheral Neurovascular WDL  --  --  --  --  WDL       Gastrointestinal    Gastrointestinal WDL  --  --  --  --  WDL       Rectum WDL    Rectal Area WDL  --  --  --  --  WDL       Genitourinary    Genitourinary WDL  --  --  --  voiding ability/characteristics  WDL    Voiding Characteristics  --  --  --  voids spontaneously without difficulty  --       Reproductive    Uterus  WDL  --  --  --  WDL  WDL    Uterus Position  --  --  --  midline  midline    Uterus Consistency  --  --  --  firm without massage  firm without massage    Fundal Height  --  --  --  1 cm below umbilicus  1 cm below umbilicus       Lochia 1-3 Days WDL    Lochia 1-3 Days WDL  --  --  --  WDL  WDL    Lochia Color  --  --  --  rubra  rubra    Lochia Amount  --  --  --  light (less than 10 cm on pad/hr)  light (less than 10 cm on pad/hr)    Lochia Odor  --  --  --  none  none       Perineum WDL    Perineum WDL  --  --  --  -- products given  WDL except;appearance;episiotomy    Perineum Appearance  --  --  --  --  swelling;redness    Episiotomy Appearance  --  --  --  --  swelling;no bruising;no drainage    Episiotomy Type  --  --  --  --  midline       Breasts WDL    Breast WDL  --  --  --  --  WDL       Skin    Skin WDL  --  --  --  --  WDL       Reza Risk Assessment    Sensory Perception  --  --  --  --  4-->no impairment    Moisture  --  --  --  --  4-->rarely moist    Activity  --  --  --  --  3-->walks occasionally    Mobility  --  --  --  --  4-->no limitation    Nutrition  --  --  --  --  3-->adequate    Friction and Shear  --  --  --  --  3-->no apparent problem    Reza Score  --  --  --  --  21       Musculoskeletal    Musculoskeletal WDL  --  --  --  --  WDL       Functional Screen (every 3 days/change)    Ambulation  --  --  --  --  2 - assistive person    Transferring  --  --  --  --  2 - assistive person    Toileting  --  --  --  --  2 - assistive person    Bathing  --  --  --  --  2 - assistive person    Dressing  --  --  --  --  2 - assistive person    Eating  --  --  --  --  0 - independent    Communication  --  --  --  --  0 - understands/communicates without difficulty    Swallowing  --  --  --  --  0 - swallows foods/liquids without difficulty       Nutrition    Diet/Nutrition Received  --  --  --  --  regular;gluten free    Nutrition Risk Screen  --  --  --  --  no indicators present        [REMOVED] Epidural Catheter 10/23/20    Epidural Properties Placement Date: 10/23/20 Placement Time: 0607 , created via procedure documentation  Removal Date: 10/23/20 Removal Time: 1930 Removed : Per protocol;Tip intact       [REMOVED] Peripheral IV 10/23/20 0520 Right Forearm    IV Properties Placement Date: 10/23/20 Placement Time: 0520 Hand Hygiene Completed: Yes Size (Gauge): 18 G Orientation: Right Location: Forearm Local Anesthetic: None Technique: Anatomical landmarks Total insertion attempts: 1 Patient Tolerance: Tolerated well Removal Date: 10/24/20 Removal Time: 0106 Removed : Per protocol    Site Assessment  --  Clean;Dry;Intact  --  Clean;Dry;Intact  Clean;Dry;Intact    Dressing Type  --  Transparent  --  Transparent  Transparent    Line Status  --  Infusing  --  Infusing  Infusing    Dressing Status  --  Clean;Dry;Intact  --  Clean;Dry;Intact  Clean;Dry;Intact    Reason Not Rotated  --  Not due  --  Not due  Not due    Phlebitis  --  0-->no symptoms  --  0-->no symptoms  0-->no symptoms       Maternal Sepsis Screening Tool    Previous maternal screen positive in last 48 hrs?  --  --  --  --  no    Are 2 or > of the above criteria present?  --  --  --  --  no: STOP/negative screen       Safety    Safety WDL  --  [facility]  [facility] (If Fall score is >/=13, add the Fall Risk CPG to the care plan)     Fallen in past 6 months  --  --  --  --  0--> No    Mental Status  --  --  --  --  0--> no mental status change    Elimination  --  --  --  --  0--> No elimination issues    Mobility  --  --  --  --  2--> Requires assistance- transfer, walker, etc.    Medications  --  --  --  --  1--> Narcotics    Nurses' Clinical Judgement  --  --  --  --  3    Total Fall Risk Score  --  --  --  --  6       Safety Management    Safety Promotion/Fall Prevention  --  safety round/check completed  safety round/check completed  safety round/check completed  safety round/check  completed    Medication Review/Management  --  --  --  --  medications reviewed       Daily Care    Activity Management  --  --  --  ambulated to bathroom  activity adjusted per tolerance    Activity Assistance Provided  --  --  --  --  assistance, 1 person       Positioning    Body Position  --  --  --  --  legs elevated    Head of Bed (HOB)  --  --  --  --  HOB lowered    Row Name 10/23/20 1929 10/23/20 1859 10/23/20 1845 10/23/20 1829 10/23/20 3949       Pain/Comfort/Sleep    Preferred Pain Scale  --  number (Numeric Rating Pain Scale)  --  --  number (Numeric Rating Pain Scale)    (0-10) Pain Rating: Rest  --  --  --  --  0    Pain Location  --  perineum  --  perineum  --    Pain Description  --  sore;burning  --  sore  --    Pain Management Interventions  --  see MAR  --  see MAR  --    Sleep/Rest/Relaxation  awake  --  --  --  --       Coping/Psychosocial    Observed Emotional State  cooperative;tearful/crying  --  --  --  --    Verbalized Emotional State  acceptance;hopefulness  --  --  --  --    Trust Relationship/Rapport  care explained;choices provided;emotional support provided;empathic listening provided;questions encouraged;questions answered  --  --  --  --    Family/Support Persons  spouse  --  --  --  --    Involvement in Care  at bedside;attentive to patient  --  --  --  --       [facility]    [facility]  --  WDL  --  --  WDL       Cognitive    Cognitive/Neuro/Behavioral [facility]  --  --  WDL       Behavior WD    Behavior [facility]  --  --  --  --       Respiratory    Respiratory WDL  [facility]  WDL  --  --  WDL    Cough And Deep Breathing  done independently per patient  --  --  --  --       Breath Sounds    Breath Sounds  All Fields  --  --  --  --    All Lung Fields Breath Sounds  Anterior:;Lateral:;clear;equal bilaterally  --  --  --  --       Oxygen Therapy    Device (Oxygen Therapy)  room air  --  --  --  --       Cardiac    Cardiac WDL  [facility]  WDL  --  --  WDL       Peripheral Neurovascular     "Peripheral Neurovascular WDL  WDL  WDL  --  --  WDL       Gastrointestinal    Gastrointestinal WDL  WDL;appearance/characteristics  WDL  --  --  WDL    Abdominal Appearance  pregnant  --  --  --  --       Rectum WDL    Rectal Area WDL  --  WDL except  --  --  WDL except rash noted/Michelle assessed-pt states \"has been there\"       Genitourinary    Genitourinary WDL  WDL  WDL  --  --  WDL       Breasts WDL    Breast WDL  WDL  WDL  --  --  WDL       Skin    Skin WDL  WDL  WDL  --  --  WDL       Reza Risk Assessment    Sensory Perception  4-->no impairment  --  --  --  --    Moisture  4-->rarely moist  --  --  --  --    Activity  3-->walks occasionally  --  --  --  --    Mobility  3-->slightly limited  --  --  --  --    Nutrition  3-->adequate  --  --  --  --    Friction and Shear  3-->no apparent problem  --  --  --  --    Reza Score  20  --  --  --  --       Musculoskeletal    Musculoskeletal WDL  WDL  WDL except;extremity movement;mobility  --  --  WDL except;extremity movement;mobility    General Mobility  --  no overt deficits noted  --  --  no overt deficits noted    Extremity Movement  --  LLE;RLE d/t epidural  --  --  LLE;RLE d/t epidural    LLE Extremity Movement  --  active ROM mildly impaired  --  --  active ROM moderately impaired    RLE Extremity Movement  --  active ROM moderately impaired  --  --  active ROM moderately impaired       Functional Screen (every 3 days/change)    Ambulation  2 - assistive person  --  --  --  --    Transferring  2 - assistive person  --  --  --  --    Toileting  0 - independent  --  --  --  --    Bathing  0 - independent  --  --  --  --    Dressing  0 - independent  --  --  --  --    Eating  0 - independent  --  --  --  --    Communication  0 - understands/communicates without difficulty  --  --  --  --    Swallowing  0 - swallows foods/liquids without difficulty  --  --  --  --       Nutrition    Diet/Nutrition Received  regular;gluten free  --  --  --  regular;gluten free "       [REMOVED] Epidural Catheter 10/23/20    Epidural Properties Placement Date: 10/23/20 Placement Time: 0607 , created via procedure documentation  Removal Date: 10/23/20 Removal Time: 1930 Removed : Per protocol;Tip intact       [REMOVED] Peripheral IV 10/23/20 0520 Right Forearm    IV Properties Placement Date: 10/23/20 Placement Time: 0520 Hand Hygiene Completed: Yes Size (Gauge): 18 G Orientation: Right Location: Forearm Local Anesthetic: None Technique: Anatomical landmarks Total insertion attempts: 1 Patient Tolerance: Tolerated well Removal Date: 10/24/20 Removal Time: 0106 Removed : Per protocol    Site Assessment  Clean;Dry;Intact  Clean;Dry;Intact  --  --  Clean;Dry;Intact    Dressing Type  Transparent  Transparent  --  --  Transparent    Line Status  Infusing  Infusing  --  --  Infusing    Dressing Status  Clean;Dry;Intact  Clean;Dry;Intact  --  --  Clean;Dry;Intact    Reason Not Rotated  Not due  --  --  --  --    Phlebitis  0-->no symptoms  --  --  --  --       Maternal Sepsis Screening Tool    Previous maternal screen positive in last 48 hrs?  no  --  --  --  no    Are 2 or > of the above criteria present?  no: STOP/negative screen  --  --  --  no: STOP/negative screen       Safety    Safety [facility]  --  --  [facility] High Risk Falls Assessment (If Fall score is >/=13, add the Fall Risk CPG to the care plan)     Fallen in past 6 months  0--> No  --  --  --  --    Mental Status  0--> no mental status change  --  --  --  --    Elimination  0--> No elimination issues  --  --  --  --    Mobility  2--> Requires assistance- transfer, walker, etc.  --  --  --  --    Medications  1--> Narcotics  --  --  --  --    Nurses' Clinical Judgement  3  --  --  --  --    Total Fall Risk Score  6  --  --  --  --       Safety Management    Safety Promotion/Fall Prevention  safety round/check completed  --  --  --  --    Medication Review/Management  medications reviewed  --  --  --  --       Daily  Care    Activity Management  activity adjusted per tolerance  --  --  --  bedrest;activity adjusted per tolerance    Activity Assistance Provided  assistance, 1 person  --  --  --  --       Positioning    Body Position  supine  --  --  --  supine    Head of Bed (HOB)  HOB at 30-45 degrees  --  --  --  HOB at 30-45 degrees    Positioning/Transfer Devices  pillows;in use  --  --  --  --       Hygiene Care    Bathing/Skin Care  linen changed  --  --  --  --    Perineal Care  absorbent pad changed;perineum cleansed  --  perineum cleansed  --  perineum cleansed;absorbent pad changed ice pack provided    Hygiene Assistance  per care provider x 1  --  per care provider x 1  --  per care provider x 1    Row Name 10/23/20 1600 10/23/20 1356 10/23/20 1219 10/23/20 1047 10/23/20 0952       [REMOVED] Urethral Catheter    Urethral Catheter Properties Placement Date: 10/23/20 Placement Time: 0629 Indications: Acute Urinary Retention Inserted by: NABIL Winter RN Number Of Insertion Attempts: 1 Hand Hygiene Completed: Yes Catheter Balloon Size: 10 mL Cleansed with: Perineal hygiene wipe;Other (comment) , Betadine  Urine Returned: Yes Removal Date: 10/23/20 Removal Time: 1417 Removal Reason : Per protocol       [REMOVED] Epidural Catheter 10/23/20    Epidural Properties Placement Date: 10/23/20 Placement Time: 0607 , created via procedure documentation  Removal Date: 10/23/20 Removal Time: 1930 Removed : Per protocol;Tip intact       [REMOVED] Peripheral IV 10/23/20 0520 Right Forearm    IV Properties Placement Date: 10/23/20 Placement Time: 0520 Hand Hygiene Completed: Yes Size (Gauge): 18 G Orientation: Right Location: Forearm Local Anesthetic: None Technique: Anatomical landmarks Total insertion attempts: 1 Patient Tolerance: Tolerated well Removal Date: 10/24/20 Removal Time: 0106 Removed : Per protocol       Hygiene Care    Perineal Care  --  absorbent pad changed  absorbent pad changed  absorbent pad changed  absorbent pad changed     Hygiene Assistance  --  per care provider x 1  per care provider x 1  per care provider x 1  per care provider x 1       Provider Notification    Response  En route pushing for 90min with good descent while pushing  --  --  --  --    Row Name 10/23/20 0918                   [REMOVED] Urethral Catheter    Urethral Catheter Properties Placement Date: 10/23/20 Placement Time: 0629 Indications: Acute Urinary Retention Inserted by: NABIL Winter RN Number Of Insertion Attempts: 1 Hand Hygiene Completed: Yes Catheter Balloon Size: 10 mL Cleansed with: Perineal hygiene wipe;Other (comment) , Betadine  Urine Returned: Yes Removal Date: 10/23/20 Removal Time: 1417 Removal Reason : Per protocol       [REMOVED] Epidural Catheter 10/23/20    Epidural Properties Placement Date: 10/23/20 Placement Time: 0607 , created via procedure documentation  Removal Date: 10/23/20 Removal Time: 1930 Removed : Per protocol;Tip intact       [REMOVED] Peripheral IV 10/23/20 0520 Right Forearm    IV Properties Placement Date: 10/23/20 Placement Time: 0520 Hand Hygiene Completed: Yes Size (Gauge): 18 G Orientation: Right Location: Forearm Local Anesthetic: None Technique: Anatomical landmarks Total insertion attempts: 1 Patient Tolerance: Tolerated well Removal Date: 10/24/20 Removal Time: 0106 Removed : Per protocol       Hygiene Care    Perineal Care  absorbent pad changed        Hygiene Assistance  per care provider x 1               Discharge Summary      Oxana Marquez MD at 10/25/20 0808          Discharge Summary    Date of Admission: 10/23/2020  Date of Discharge:  10/25/2020      Patient: Arabella Lau      MR#:[id number]    Delivery Provider: Lina Sanchez Surgeon/OB: Oxana Marquez MD      Presenting Problem/History of Present Illness  Normal labor [O80, Z37.9]     Patient Active Problem List   Diagnosis   • Gestational diabetes   • Normal labor         Discharge Diagnosis: Vaginal delivery at  38w5d    Procedures:  Vaginal, Spontaneous     10/23/2020    5:41 PM        Discharge Date: 10/25/2020;     Hospital Course  Patient is a 25 y.o. female  at 38w5d status post vaginal delivery without complication. Postpartum the patient did well. She remained afebrile, with vital signs stable. She was ready for discharge on postpartum day 2.     Infant:   female  fetus 3660 g (8 lb 1.1 oz)  with Apgar scores of 8 , 9  at five minutes.    Condition on Discharge:  Stable    Vital Signs  Temp:  [98 °F (36.7 °C)-98.5 °F (36.9 °C)] 98.5 °F (36.9 °C)  Heart Rate:  [78-88] 78  Resp:  [16] 16  BP: (110-112)/(67-69) 110/67    Lab Results   Component Value Date    WBC 8.93 10/25/2020    HGB 9.7 (L) 10/25/2020    HCT 31.1 (L) 10/25/2020    .0 (H) 10/25/2020     (L) 10/25/2020       Discharge Disposition  Home or Self Care    Discharge Medications     Discharge Medications      New Medications      Instructions Start Date   docusate sodium 100 MG capsule   100 mg, Oral, 2 Times Daily      ibuprofen 600 MG tablet  Commonly known as: ADVIL,MOTRIN   600 mg, Oral, Every 6 Hours PRN         Continue These Medications      Instructions Start Date   doxylamine 25 MG tablet  Commonly known as: UNISOM   25 mg, Oral, Nightly PRN      prenatal (CLASSIC) vitamin  tablet  Generic drug: prenatal vitamin   1 tablet, Oral, Daily      vitamin B-6 25 MG tablet  Commonly known as: PYRIDOXINE   25 mg, Oral, Daily             Discharge Diet:     Activity at Discharge:     Follow-up Appointments  Future Appointments   Date Time Provider Department Center   10/29/2020 11:20 AM Lina Martinez MD [facility]         Oxana Marquez MD  10/25/20  08:09 EDT  Cs[facility]          Electronically signed by Oxana Marquez MD at 10/25/20 0809

## 2020-11-16 ENCOUNTER — TELEPHONE (OUTPATIENT)
Dept: OBSTETRICS AND GYNECOLOGY | Facility: CLINIC | Age: 25
End: 2020-11-16